# Patient Record
Sex: FEMALE | Race: WHITE | NOT HISPANIC OR LATINO | Employment: FULL TIME | URBAN - METROPOLITAN AREA
[De-identification: names, ages, dates, MRNs, and addresses within clinical notes are randomized per-mention and may not be internally consistent; named-entity substitution may affect disease eponyms.]

---

## 2017-03-06 ENCOUNTER — TRANSCRIBE ORDERS (OUTPATIENT)
Dept: ADMINISTRATIVE | Age: 22
End: 2017-03-06

## 2017-03-06 ENCOUNTER — APPOINTMENT (OUTPATIENT)
Dept: LAB | Age: 22
End: 2017-03-06
Attending: PREVENTIVE MEDICINE

## 2017-03-06 DIAGNOSIS — Z00.00 ROUTINE GENERAL MEDICAL EXAMINATION AT A HEALTH CARE FACILITY: ICD-10-CM

## 2017-03-06 DIAGNOSIS — Z00.00 ROUTINE GENERAL MEDICAL EXAMINATION AT A HEALTH CARE FACILITY: Primary | ICD-10-CM

## 2017-03-06 PROCEDURE — 36415 COLL VENOUS BLD VENIPUNCTURE: CPT

## 2017-03-07 ENCOUNTER — TRANSCRIBE ORDERS (OUTPATIENT)
Dept: ADMINISTRATIVE | Age: 22
End: 2017-03-07

## 2017-03-07 ENCOUNTER — APPOINTMENT (OUTPATIENT)
Dept: LAB | Age: 22
End: 2017-03-07
Attending: PREVENTIVE MEDICINE

## 2017-03-07 DIAGNOSIS — Z00.00 ROUTINE GENERAL MEDICAL EXAMINATION AT A HEALTH CARE FACILITY: Primary | ICD-10-CM

## 2017-03-07 DIAGNOSIS — Z00.00 ROUTINE GENERAL MEDICAL EXAMINATION AT A HEALTH CARE FACILITY: ICD-10-CM

## 2017-03-07 PROCEDURE — 86480 TB TEST CELL IMMUN MEASURE: CPT

## 2017-03-07 PROCEDURE — 36415 COLL VENOUS BLD VENIPUNCTURE: CPT

## 2017-03-09 LAB
ANNOTATION COMMENT IMP: NORMAL
GAMMA INTERFERON BACKGROUND BLD IA-ACNC: 0.04 IU/ML
M TB IFN-G BLD-IMP: NEGATIVE
M TB IFN-G CD4+ BCKGRND COR BLD-ACNC: <0.01 IU/ML
M TB IFN-G CD4+ T-CELLS BLD-ACNC: 0.03 IU/ML
MITOGEN IGNF BLD-ACNC: >10 IU/ML
QUANTIFERON-TB GOLD IN TUBE: NORMAL
SERVICE CMNT-IMP: NORMAL

## 2017-06-05 ENCOUNTER — ALLSCRIPTS OFFICE VISIT (OUTPATIENT)
Dept: OTHER | Facility: OTHER | Age: 22
End: 2017-06-05

## 2017-10-11 ENCOUNTER — OFFICE VISIT (OUTPATIENT)
Dept: URGENT CARE | Age: 22
End: 2017-10-11
Payer: COMMERCIAL

## 2017-10-11 PROCEDURE — 99283 EMERGENCY DEPT VISIT LOW MDM: CPT | Performed by: FAMILY MEDICINE

## 2017-10-11 PROCEDURE — G0382 LEV 3 HOSP TYPE B ED VISIT: HCPCS | Performed by: FAMILY MEDICINE
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
## 2017-12-26 ENCOUNTER — HOSPITAL ENCOUNTER (EMERGENCY)
Facility: HOSPITAL | Age: 22
Discharge: HOME | End: 2017-12-26
Attending: EMERGENCY MEDICINE

## 2017-12-26 VITALS
TEMPERATURE: 97.3 F | SYSTOLIC BLOOD PRESSURE: 150 MMHG | RESPIRATION RATE: 18 BRPM | DIASTOLIC BLOOD PRESSURE: 87 MMHG | WEIGHT: 165 LBS | OXYGEN SATURATION: 100 % | HEART RATE: 87 BPM

## 2017-12-26 DIAGNOSIS — R11.0 NAUSEA: ICD-10-CM

## 2017-12-26 DIAGNOSIS — R10.10 PAIN OF UPPER ABDOMEN: Primary | ICD-10-CM

## 2017-12-26 LAB
BACTERIA UR QL AUTO: ABNORMAL /HPF
BILIRUB UR QL STRIP: NEGATIVE
CLARITY UR: CLEAR
COLOR UR: YELLOW
COLOR, POC: NORMAL
EXT PREG TEST URINE: NEGATIVE
GLUCOSE UR STRIP-MCNC: NEGATIVE MG/DL
HGB UR QL STRIP.AUTO: ABNORMAL
HYALINE CASTS #/AREA URNS LPF: ABNORMAL /LPF
KETONES UR STRIP-MCNC: NEGATIVE MG/DL
LEUKOCYTE ESTERASE UR QL STRIP: ABNORMAL
NITRITE UR QL STRIP: NEGATIVE
NON-SQ EPI CELLS URNS QL MICRO: ABNORMAL /HPF
PH UR STRIP.AUTO: 7 [PH] (ref 4.5–8)
PROT UR STRIP-MCNC: NEGATIVE MG/DL
RBC #/AREA URNS AUTO: ABNORMAL /HPF
SP GR UR STRIP.AUTO: 1.02 (ref 1–1.03)
UROBILINOGEN UR QL STRIP.AUTO: 1 E.U./DL
WBC #/AREA URNS AUTO: ABNORMAL /HPF

## 2017-12-26 PROCEDURE — 99284 EMERGENCY DEPT VISIT MOD MDM: CPT

## 2017-12-26 PROCEDURE — 81001 URINALYSIS AUTO W/SCOPE: CPT

## 2017-12-26 PROCEDURE — 81025 URINE PREGNANCY TEST: CPT | Performed by: EMERGENCY MEDICINE

## 2017-12-26 PROCEDURE — 81002 URINALYSIS NONAUTO W/O SCOPE: CPT | Performed by: EMERGENCY MEDICINE

## 2017-12-26 RX ORDER — NORETHINDRONE ACETATE AND ETHINYL ESTRADIOL 1; .02 MG/1; MG/1
1 TABLET ORAL DAILY
COMMUNITY
Start: 2017-05-12 | End: 2021-02-24 | Stop reason: ALTCHOICE

## 2017-12-26 RX ORDER — ONDANSETRON 4 MG/1
4 TABLET, ORALLY DISINTEGRATING ORAL EVERY 6 HOURS PRN
Qty: 5 TABLET | Refills: 0 | Status: SHIPPED | OUTPATIENT
Start: 2017-12-26 | End: 2018-06-26 | Stop reason: ALTCHOICE

## 2017-12-26 RX ORDER — ONDANSETRON 4 MG/1
4 TABLET, ORALLY DISINTEGRATING ORAL ONCE
Status: COMPLETED | OUTPATIENT
Start: 2017-12-26 | End: 2017-12-26

## 2017-12-26 RX ADMIN — ONDANSETRON 4 MG: 4 TABLET, ORALLY DISINTEGRATING ORAL at 02:09

## 2017-12-26 NOTE — DISCHARGE INSTRUCTIONS
Abdominal Pain   WHAT YOU NEED TO KNOW:   Abdominal pain can be dull, achy, or sharp  You may have pain in one area of your abdomen, or in your entire abdomen  Your pain may be caused by a condition such as constipation, food sensitivity or poisoning, infection, or a blockage  Abdominal pain can also be from a hernia, appendicitis, or an ulcer  Liver, gallbladder, or kidney conditions can also cause abdominal pain  The cause of your abdominal pain may be unknown  DISCHARGE INSTRUCTIONS:   Return to the emergency department if:   · You have new chest pain or shortness of breath  · You have pulsing pain in your upper abdomen or lower back that suddenly becomes constant  · Your pain is in the right lower abdominal area and worsens with movement  · You have a fever over 100 4°F (38°C) or shaking chills  · You are vomiting and cannot keep food or liquids down  · Your pain does not improve or gets worse over the next 8 to 12 hours  · You see blood in your vomit or bowel movements, or they look black and tarry  · Your skin or the whites of your eyes turn yellow  · You are a woman and have a large amount of vaginal bleeding that is not your monthly period  Contact your healthcare provider if:   · You have pain in your lower back  · You are a man and have pain in your testicles  · You have pain when you urinate  · You have questions or concerns about your condition or care  Follow up with your healthcare provider within 24 hours or as directed:  Write down your questions so you remember to ask them during your visits  Medicines:   · Medicines  may be given to calm your stomach and prevent vomiting or to decrease pain  Ask how to take pain medicine safely  · Take your medicine as directed  Contact your healthcare provider if you think your medicine is not helping or if you have side effects  Tell him of her if you are allergic to any medicine   Keep a list of the medicines, vitamins, and herbs you take  Include the amounts, and when and why you take them  Bring the list or the pill bottles to follow-up visits  Carry your medicine list with you in case of an emergency  © 2017 [facility] Information is for End User's use only and may not be sold, redistributed or otherwise used for commercial purposes  All illustrations and images included in CareNotes® are the copyrighted property of A D A M , Inc  or Kris Dey  The above information is an  only  It is not intended as medical advice for individual conditions or treatments  Talk to your doctor, nurse or pharmacist before following any medical regimen to see if it is safe and effective for you

## 2017-12-26 NOTE — ED ATTENDING ATTESTATION
Dwight Calabrese MD, saw and evaluated the patient  I have discussed the patient with the resident/non-physician practitioner and agree with the resident's/non-physician practitioner's findings, Plan of Care, and MDM as documented in the resident's/non-physician practitioner's note, except where noted  All available labs and Radiology studies were reviewed  At this point I agree with the current assessment done in the Emergency Department  I have conducted an independent evaluation of this patient including a focused history of:    Emergency Department Note- Paula Jolly 25 y o  female MRN: [id number]    Unit/Bed#: CRB Encounter: [id number]    Paula Jolly is a 25 y o  female who presents with   Chief Complaint   Patient presents with    Abdominal Pain     pt c/o upper abdominal pain since saturday  notes associated nausea  History of Present Illness   HPI:  Paula Jolly is a 25 y o  female who presents for evaluation of:   abdominal discomfort  The patient has had intermittent abdominal discomfort since Saturday  She had another exacerbation of her abdominal discomfort this evening  The patient had some associated nausea but has not vomited  She denies any associated diarrhea or constipation  The patient denies contact with patients with similar symptoms  Patient denies any associated dysuria and hematuria  No LMP recorded  Review of Systems   Constitutional: Positive for fatigue  Negative for fever  HENT: Negative for congestion and sore throat  Respiratory: Negative for cough and chest tightness  All other systems reviewed and are negative  Historical Information   Past Medical History:   Diagnosis Date    Asthma      History reviewed  No pertinent surgical history    Social History   History   Alcohol Use    Yes     Comment: occasional     History   Drug Use No     History   Smoking Status    Never Smoker   Smokeless Tobacco    Never Used     Family History: non-contributory    Meds/Allergies   all medications and allergies reviewed  No Known Allergies    Objective   First Vitals:   Blood Pressure: 150/87 (17 0121)  Pulse: 87 (17 012)  Temperature: (!) 97 3 °F (36 3 °C) (17 012)  Temp Source: Tympanic (17)  Respirations: 18 (17)  Weight - Scale: 74 8 kg (165 lb) (17)  SpO2: 100 % (17 012)    Current Vitals:   Blood Pressure: 150/87 (17 012)  Pulse: 87 (17 012)  Temperature: (!) 97 3 °F (36 3 °C) (17)  Temp Source: Tympanic (17)  Respirations: 18 (17)  Weight - Scale: 74 8 kg (165 lb) (17)  SpO2: 100 % (17)    No intake or output data in the 24 hours ending 17 0208    Invasive Devices          No matching active lines, drains, or airways          Physical Exam   Constitutional: She is oriented to person, place, and time  She appears well-developed and well-nourished  HENT:   Head: Normocephalic and atraumatic  Eyes: Conjunctivae are normal  Pupils are equal, round, and reactive to light  Pulmonary/Chest: Effort normal and breath sounds normal    Abdominal: Soft  Bowel sounds are normal    Musculoskeletal: Normal range of motion  She exhibits no deformity  Neurological: She is alert and oriented to person, place, and time  Skin: Skin is warm and dry  Psychiatric: She has a normal mood and affect  Her behavior is normal  Judgment and thought content normal    Nursing note and vitals reviewed  Medical Decision Makin  Acute nausea associated with intermittent abdominal pain:  Likely  Viral gastritis; plan to treat with antiemetics      Recent Results (from the past 36 hour(s))   POCT urinalysis dipstick    Collection Time: 17  1:41 AM   Result Value Ref Range    Color, UA see results    POCT pregnancy, urine    Collection Time: 17  1:41 AM   Result Value Ref Range    EXT PREG TEST UR (Ref: Negative) negative    ED Urine Macroscopic    Collection Time: 12/26/17  1:41 AM   Result Value Ref Range    Color, UA Yellow     Clarity, UA Clear     pH, UA 7 0 4 5 - 8 0    Leukocytes, UA Trace (A) Negative    Nitrite, UA Negative Negative    Protein, UA Negative Negative mg/dl    Glucose, UA Negative Negative mg/dl    Ketones, UA Negative Negative mg/dl    Urobilinogen, UA 1 0 0 2, 1 0 E U /dl E U /dl    Bilirubin, UA Negative Negative    Blood, UA Trace (A) Negative    Specific Gravity, UA 1 025 1 003 - 1 030   Urine Microscopic    Collection Time: 12/26/17  1:41 AM   Result Value Ref Range    RBC, UA 4-10 (A) None Seen, 0-5 /hpf    WBC, UA 4-10 (A) None Seen, 0-5, 5-55, 5-65 /hpf    Epithelial Cells Occasional None Seen, Occasional /hpf    Bacteria, UA Occasional None Seen, Occasional /hpf    Hyaline Casts, UA 5-10 (A) None Seen /lpf     No orders to display         Portions of the record may have been created with voice recognition software  Occasional wrong word or "sound a like" substitutions may have occurred due to the inherent limitations of voice recognition software  Read the chart carefully and recognize, using context, where substitutions have occurred

## 2017-12-26 NOTE — ED PROVIDER NOTES
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 Taking?   norethindrone-ethinyl estradiol (JUNEL 1/20) 1-20 MG-MCG per tablet   Yes Yes   Sig: Take 1 tablet by mouth daily      Facility-Administered Medications: None     Past Medical History:   Diagnosis Date    Asthma      History reviewed  No pertinent surgical history  History reviewed  No pertinent family history  I have reviewed and agree with the history as documented  Social History   Substance Use Topics    Smoking status: Never Smoker    Smokeless tobacco: Never Used    Alcohol use Yes      Comment: occasional        Review of Systems   Constitutional: Negative for chills, fatigue, fever and unexpected weight change  HENT: Negative for congestion, rhinorrhea and sore throat  Eyes: Negative for redness and visual disturbance  Respiratory: Negative for cough and shortness of breath  Cardiovascular: Negative for chest pain and leg swelling  Gastrointestinal: Positive for nausea  Negative for abdominal pain, constipation, diarrhea and vomiting  Endocrine: Negative for cold intolerance and heat intolerance  Genitourinary: Negative for dysuria, frequency and urgency     Musculoskeletal: Negative for back pain    Skin: Negative for rash  Neurological: Negative for dizziness, syncope and numbness  All other systems reviewed and are negative  Physical Exam  ED Triage Vitals [12/26/17 0121]   Temperature Pulse Respirations Blood Pressure SpO2   (!) 97 3 °F (36 3 °C) 87 18 150/87 100 %      Temp Source Heart Rate Source Patient Position - Orthostatic VS BP Location FiO2 (%)   Tympanic Monitor Sitting Left arm --      Pain Score       5           Orthostatic Vital Signs  Vitals:    12/26/17 0121   BP: 150/87   Pulse: 87   Patient Position - Orthostatic VS: Sitting       Physical Exam   Constitutional: She is oriented to person, place, and time  She appears well-developed and well-nourished  No distress  HENT:   Head: Normocephalic and atraumatic  Nose: Nose normal    Mouth/Throat: No oropharyngeal exudate  Eyes: Conjunctivae and EOM are normal  Pupils are equal, round, and reactive to light  Neck: Normal range of motion  Neck supple  Cardiovascular: Normal rate, regular rhythm and normal heart sounds  Exam reveals no gallop  No murmur heard  Pulmonary/Chest: Effort normal and breath sounds normal  She has no wheezes  She exhibits no tenderness  Abdominal: Soft  Bowel sounds are normal  She exhibits no distension  There is no tenderness  There is no rebound, no guarding and no CVA tenderness  Musculoskeletal: Normal range of motion  She exhibits no tenderness or deformity  Lymphadenopathy:     She has no cervical adenopathy  Neurological: She is alert and oriented to person, place, and time  No cranial nerve deficit  Skin: Skin is warm and dry  No rash noted  She is not diaphoretic  No erythema  Psychiatric: She has a normal mood and affect  Nursing note and vitals reviewed      ED Medications  Medications   ondansetron (ZOFRAN-ODT) dispersible tablet 4 mg (4 mg Oral Given 12/26/17 0209)       Diagnostic Studies  Results Reviewed     Procedure Component Value Units Date/Time    Urine Microscopic [[id number]]  (Abnormal) Collected:  12/26/17 0141    Lab Status:  Final result Specimen:  Urine from Urine, Other Updated:  12/26/17 0152     RBC, UA 4-10 (A) /hpf      WBC, UA 4-10 (A) /hpf      Epithelial Cells Occasional /hpf      Bacteria, UA Occasional /hpf      Hyaline Casts, UA 5-10 (A) /lpf     POCT pregnancy, urine [[id number]]  (Normal) Resulted:  12/26/17 0141    Lab Status:  Final result Updated:  12/26/17 0141     EXT PREG TEST UR (Ref: Negative) negative    POCT urinalysis dipstick [[id number]]  (Normal) Resulted:  12/26/17 0141    Lab Status:  Final result Updated:  12/26/17 0141     Color, UA see results    ED Urine Macroscopic [[id number]]  (Abnormal) Collected:  12/26/17 0141    Lab Status:  Final result Specimen:  Urine Updated:  12/26/17 0140     Color, UA Yellow     Clarity, UA Clear     pH, UA 7 0     Leukocytes, UA Trace (A)     Nitrite, UA Negative     Protein, UA Negative mg/dl      Glucose, UA Negative mg/dl      Ketones, UA Negative mg/dl      Urobilinogen, UA 1 0 E U /dl      Bilirubin, UA Negative     Blood, UA Trace (A)     Specific [place], UA 1 025    Narrative:       CLINITEK RESULT        No orders to display     Procedures  Procedures    Phone Consults  ED Phone Contact    ED Course    A/P: This is a 25 y o  female who presents to the ED for evaluation of   Abdominal pain  Patient is well-appearing with normal vital signs and they absolutely normal exam   I cannot elicit any tenderness of the flank or abdomen  We will check UA to rule out pregnancy, urinary tract infection, pyelonephritis  If this is normal, we will treat symptomatically for nausea and discharged home with expectant management  0245   Urinalysis reviewed  Urine microscopic shows squamous epithelial cells suspicious for contaminated sample  In absence of overwhelming signs of urinary tract infection, we will not treat at this time  Suspect viral illness    She feels better after having a dose of sublingual Zofran  Suspect nonspecific abdominal pain  Return precautions discussed  Patient and family acknowledge receipt of same  We will discharge this patient home with primary care follow-up  Patient is in agreement with this plan as outlined above  MDM  CritCare Time    Disposition  Final diagnoses:   Pain of upper abdomen   Nausea     Time reflects when diagnosis was documented in both MDM as applicable and the Disposition within this note     Time User Action Codes Description Comment    12/26/2017  2:09 AM Alberta Sincere Add [R10 10] Pain of upper abdomen     12/26/2017  2:10 AM Promise Sanchezdy Add [R11 0] Nausea       ED Disposition     ED Disposition Condition Comment    Discharge  Thanh Lavinia discharge to home/self care  Condition at discharge: Stable        Follow-up Information     Follow up With Specialties Details Why Contact Info Additional [address] [facility] Nikki Jaffe MD Family Medicine Schedule an appointment as soon as possible for a visit in 2 days For reevaluation 91 [facility] Briana GAO [place] [address]       [address] Emergency Department Emergency Medicine Go to If symptoms worsen [address] [facility] 809 [facility] ED, [address], [facility]        Discharge Medication List as of 12/26/2017  2:10 AM      START taking these medications    Details   ondansetron (ZOFRAN-ODT) 4 mg disintegrating tablet Take 1 tablet by mouth every 6 (six) hours as needed for nausea or vomiting, Starting Tue 12/26/2017, Print         CONTINUE these medications which have NOT CHANGED    Details   norethindrone-ethinyl estradiol (JUNEL 1/20) 1-20 MG-MCG per tablet Take 1 tablet by mouth daily, Starting Fri 5/12/2017, Historical Med           No discharge procedures on file  ED Provider  Attending physically available and evaluated Thanh Lavinia VELIZ managed the patient along with the ED Attending      Electronically Signed by         Zoltan Farfan MD  Resident  12/26/17 9481

## 2018-01-13 VITALS
HEART RATE: 76 BPM | BODY MASS INDEX: 27.45 KG/M2 | RESPIRATION RATE: 16 BRPM | DIASTOLIC BLOOD PRESSURE: 62 MMHG | HEIGHT: 64 IN | WEIGHT: 160.8 LBS | SYSTOLIC BLOOD PRESSURE: 124 MMHG | TEMPERATURE: 98.5 F

## 2018-06-26 ENCOUNTER — OFFICE VISIT (OUTPATIENT)
Dept: FAMILY MEDICINE CLINIC | Facility: CLINIC | Age: 23
End: 2018-06-26

## 2018-06-26 VITALS
WEIGHT: 162.4 LBS | SYSTOLIC BLOOD PRESSURE: 108 MMHG | HEART RATE: 76 BPM | RESPIRATION RATE: 16 BRPM | TEMPERATURE: 99.6 F | DIASTOLIC BLOOD PRESSURE: 66 MMHG | BODY MASS INDEX: 27.88 KG/M2

## 2018-06-26 DIAGNOSIS — R39.9 SYMPTOMS OF URINARY TRACT INFECTION: Primary | ICD-10-CM

## 2018-06-26 DIAGNOSIS — N30.01 ACUTE CYSTITIS WITH HEMATURIA: ICD-10-CM

## 2018-06-26 LAB
SL AMB  POCT GLUCOSE, UA: NORMAL
SL AMB LEUKOCYTE ESTERASE,UA: NORMAL
SL AMB POCT BILIRUBIN,UA: NORMAL
SL AMB POCT BLOOD,UA: NORMAL
SL AMB POCT CLARITY,UA: NORMAL
SL AMB POCT COLOR,UA: NORMAL
SL AMB POCT KETONES,UA: NORMAL
SL AMB POCT NITRITE,UA: NORMAL
SL AMB POCT PH,UA: 6.5
SL AMB POCT SPECIFIC GRAVITY,UA: 1.01
SL AMB POCT URINE PROTEIN: NORMAL
SL AMB POCT UROBILINOGEN: 0.2

## 2018-06-26 PROCEDURE — 99213 OFFICE O/P EST LOW 20 MIN: CPT | Performed by: INTERNAL MEDICINE

## 2018-06-26 PROCEDURE — 81002 URINALYSIS NONAUTO W/O SCOPE: CPT | Performed by: INTERNAL MEDICINE

## 2018-06-26 RX ORDER — ALBUTEROL SULFATE 90 UG/1
2 AEROSOL, METERED RESPIRATORY (INHALATION) EVERY 6 HOURS
COMMUNITY
Start: 2017-10-16 | End: 2020-02-21 | Stop reason: SDUPTHER

## 2018-06-26 RX ORDER — CIPROFLOXACIN 500 MG/1
500 TABLET, FILM COATED ORAL EVERY 12 HOURS SCHEDULED
Qty: 10 TABLET | Refills: 0 | Status: SHIPPED | OUTPATIENT
Start: 2018-06-26 | End: 2018-07-01

## 2018-06-26 NOTE — PATIENT INSTRUCTIONS

## 2018-06-26 NOTE — PROGRESS NOTES
ASSESSMENT and PLAN:  Nuzhat Manning is a 26 y o  female with:   Problem List Items Addressed This Visit     Acute cystitis with hematuria     Add cipro  Call if no better  Hydrate           Relevant Medications    ciprofloxacin (CIPRO) 500 mg tablet      Other Visit Diagnoses     Symptoms of urinary tract infection    -  Primary    Relevant Medications    ciprofloxacin (CIPRO) 500 mg tablet    Other Relevant Orders    POCT urine dip (Completed)          SUBJECTIVE:  Nuzhat Manning is a 26 y o  female who presents today with a chief complaint of Painful Urination    Dysuria for 2 days; Pain in lower abdomen; her urine shows leuk and blood; she did have hematuria; Review of Systems   Constitutional: Negative  HENT: Negative  Eyes: Negative  Respiratory: Negative  Cardiovascular: Negative  Gastrointestinal: Negative  Genitourinary: Positive for dysuria, frequency, pelvic pain and urgency  Negative for difficulty urinating, dyspareunia, enuresis and flank pain  Allergic/Immunologic: Negative  Neurological: Negative  Hematological: Negative  Psychiatric/Behavioral: Negative  I have reviewed the patient's PMH, Social History, Medication List and Allergies  OBJECTIVE:  /66 (BP Location: Left arm, Patient Position: Sitting, Cuff Size: Adult)   Pulse 76   Temp 99 6 °F (37 6 °C) (Tympanic)   Resp 16   Wt 73 7 kg (162 lb 6 4 oz)   LMP 06/18/2018   BMI 27 88 kg/m²   Physical Exam   Constitutional: She is oriented to person, place, and time  She appears well-developed and well-nourished  HENT:   Head: Normocephalic and atraumatic  Right Ear: External ear normal    Left Ear: External ear normal    Eyes: Pupils are equal, round, and reactive to light  Neck: Normal range of motion  Neck supple  Cardiovascular: Normal rate, regular rhythm and normal heart sounds  Pulmonary/Chest: Effort normal and breath sounds normal  No respiratory distress   She has no wheezes  Abdominal: Soft  She exhibits no mass  There is tenderness (suprapubic tenderness ; left upper quad tenderness; normal bs;  no cva tenderness)  There is no guarding  Musculoskeletal: Normal range of motion  Neurological: She is oriented to person, place, and time  Skin: Skin is warm and dry  Psychiatric: She has a normal mood and affect  Her behavior is normal    Nursing note and vitals reviewed

## 2018-08-13 ENCOUNTER — OFFICE VISIT (OUTPATIENT)
Dept: FAMILY MEDICINE CLINIC | Facility: CLINIC | Age: 23
End: 2018-08-13
Payer: COMMERCIAL

## 2018-08-13 VITALS
SYSTOLIC BLOOD PRESSURE: 98 MMHG | HEIGHT: 64 IN | WEIGHT: 155.4 LBS | DIASTOLIC BLOOD PRESSURE: 58 MMHG | TEMPERATURE: 99.6 F | HEART RATE: 88 BPM | BODY MASS INDEX: 26.53 KG/M2

## 2018-08-13 DIAGNOSIS — J01.00 ACUTE NON-RECURRENT MAXILLARY SINUSITIS: Primary | ICD-10-CM

## 2018-08-13 PROCEDURE — 3008F BODY MASS INDEX DOCD: CPT | Performed by: INTERNAL MEDICINE

## 2018-08-13 PROCEDURE — 99213 OFFICE O/P EST LOW 20 MIN: CPT | Performed by: INTERNAL MEDICINE

## 2018-08-13 RX ORDER — PREDNISONE 10 MG/1
TABLET ORAL
Qty: 15 TABLET | Refills: 0 | Status: SHIPPED | OUTPATIENT
Start: 2018-08-13 | End: 2018-12-04 | Stop reason: ALTCHOICE

## 2018-08-13 RX ORDER — AZITHROMYCIN 250 MG/1
TABLET, FILM COATED ORAL
Qty: 6 TABLET | Refills: 0 | Status: SHIPPED | OUTPATIENT
Start: 2018-08-13 | End: 2018-08-17

## 2018-08-13 RX ORDER — OFLOXACIN 3 MG/ML
2 SOLUTION/ DROPS OPHTHALMIC 4 TIMES DAILY
Refills: 1 | COMMUNITY
Start: 2018-08-08 | End: 2018-12-04 | Stop reason: ALTCHOICE

## 2018-08-13 NOTE — ASSESSMENT & PLAN NOTE
Due to worsening of sx and wheezing/sinus pressure treat with abx and pred  Update us in 24 hours  Rest  Hydrate  Continue with otc meds

## 2018-08-13 NOTE — PROGRESS NOTES
ASSESSMENT and PLAN:  Thanh Kate is a 26 y o  female with:   Problem List Items Addressed This Visit     Acute non-recurrent maxillary sinusitis - Primary     Due to worsening of sx and wheezing/sinus pressure treat with abx and pred  Update us in 24 hours  Rest  Hydrate  Continue with otc meds         Relevant Medications    azithromycin (ZITHROMAX) 250 mg tablet    predniSONE 10 mg tablet          SUBJECTIVE:  Thanh Kate is a 26 y o  female who presents today with a chief complaint of Nasal Congestion; Cough; and Conjunctivitis    patietn started with pink eye on left- was put on drops- got worse; was referred to ophtho- dr Johnson Challenger- had metal shard in left eye-  Also put on eye drops-  Got better on left side- now with right eye redness; On thrus and Friday she had sinus pressure, post nasal drip, cough, congestion and feeling poorly with low grade temp she is on steroid eye drops and abx eye drops       Review of Systems   Constitutional: Positive for fatigue and fever  HENT: Positive for congestion, postnasal drip, rhinorrhea, sinus pain, sinus pressure and sore throat  Eyes: Positive for redness  Respiratory: Positive for cough  Cardiovascular: Negative  Gastrointestinal: Negative  Endocrine: Negative  Genitourinary: Negative  Musculoskeletal: Negative  Skin: Negative  Allergic/Immunologic: Negative  Neurological: Negative  Hematological: Negative  Psychiatric/Behavioral: Negative  I have reviewed the patient's PMH, Social History, Medication List and Allergies  OBJECTIVE:  BP 98/58 (BP Location: Left arm, Patient Position: Sitting, Cuff Size: Large)   Pulse 88   Temp 99 6 °F (37 6 °C)   Ht 5' 4" (1 626 m)   Wt 70 5 kg (155 lb 6 4 oz)   LMP 07/13/2018 (Exact Date)   Breastfeeding? No   BMI 26 67 kg/m²   Physical Exam   Constitutional: She is oriented to person, place, and time  She appears well-developed and well-nourished     HENT:   Head: Normocephalic and atraumatic  Right Ear: External ear normal    Left Ear: External ear normal    Nose: Nose normal    Mouth/Throat: Oropharynx is clear and moist    Left tm erythmeatousPressure b/l max sinus   Eyes: EOM are normal  Pupils are equal, round, and reactive to light  Right conjunctiva is injected  Left conjunctiva is injected  Neck: Normal range of motion  No JVD present  No tracheal deviation present  No thyromegaly present  Cardiovascular: Normal rate, regular rhythm and normal heart sounds  Pulmonary/Chest: Effort normal  She has wheezes (exp wheeze on left)  Lymphadenopathy:     She has no cervical adenopathy  Neurological: She is alert and oriented to person, place, and time  She has normal reflexes  Skin: Skin is warm and dry  Nursing note and vitals reviewed

## 2018-09-20 ENCOUNTER — TELEPHONE (OUTPATIENT)
Dept: FAMILY MEDICINE CLINIC | Facility: CLINIC | Age: 23
End: 2018-09-20

## 2018-09-20 DIAGNOSIS — R39.15 URINARY URGENCY: Primary | ICD-10-CM

## 2018-09-20 RX ORDER — NITROFURANTOIN 25; 75 MG/1; MG/1
100 CAPSULE ORAL 2 TIMES DAILY
Qty: 10 CAPSULE | Refills: 0 | Status: SHIPPED | OUTPATIENT
Start: 2018-09-20 | End: 2018-09-21 | Stop reason: SDUPTHER

## 2018-09-20 NOTE — TELEPHONE ENCOUNTER
Pt calling with sx of abd pain, frequency, burning x 3days  She does not have time to come in for an appt today because she will be in a meeting at work  She is asking if jadyn can send something in to [facility] on [facility]  Pt was seen on 06/26/18 for similar sx

## 2018-09-21 DIAGNOSIS — R39.15 URINARY URGENCY: ICD-10-CM

## 2018-09-21 RX ORDER — NITROFURANTOIN 25; 75 MG/1; MG/1
100 CAPSULE ORAL 2 TIMES DAILY
Qty: 10 CAPSULE | Refills: 0 | Status: SHIPPED | OUTPATIENT
Start: 2018-09-21 | End: 2018-12-04 | Stop reason: ALTCHOICE

## 2018-11-07 ENCOUNTER — IMMUNIZATION (OUTPATIENT)
Dept: FAMILY MEDICINE CLINIC | Facility: CLINIC | Age: 23
End: 2018-11-07
Payer: COMMERCIAL

## 2018-11-07 DIAGNOSIS — Z23 NEED FOR IMMUNIZATION AGAINST INFLUENZA: Primary | ICD-10-CM

## 2018-11-07 PROCEDURE — 90686 IIV4 VACC NO PRSV 0.5 ML IM: CPT

## 2018-11-07 PROCEDURE — 90471 IMMUNIZATION ADMIN: CPT

## 2018-12-04 ENCOUNTER — OFFICE VISIT (OUTPATIENT)
Dept: FAMILY MEDICINE CLINIC | Facility: CLINIC | Age: 23
End: 2018-12-04
Payer: COMMERCIAL

## 2018-12-04 VITALS
DIASTOLIC BLOOD PRESSURE: 60 MMHG | SYSTOLIC BLOOD PRESSURE: 100 MMHG | WEIGHT: 163.2 LBS | BODY MASS INDEX: 27.19 KG/M2 | TEMPERATURE: 97.4 F | HEIGHT: 65 IN | HEART RATE: 60 BPM | RESPIRATION RATE: 16 BRPM

## 2018-12-04 DIAGNOSIS — F41.9 ANXIETY: ICD-10-CM

## 2018-12-04 DIAGNOSIS — D50.8 OTHER IRON DEFICIENCY ANEMIA: ICD-10-CM

## 2018-12-04 DIAGNOSIS — R53.82 CHRONIC FATIGUE: ICD-10-CM

## 2018-12-04 DIAGNOSIS — Z13.220 SCREENING FOR HYPERLIPIDEMIA: ICD-10-CM

## 2018-12-04 DIAGNOSIS — Z00.00 ROUTINE ADULT HEALTH MAINTENANCE: Primary | ICD-10-CM

## 2018-12-04 PROBLEM — D64.9 ABSOLUTE ANEMIA: Status: ACTIVE | Noted: 2018-12-04

## 2018-12-04 PROBLEM — J01.00 ACUTE NON-RECURRENT MAXILLARY SINUSITIS: Status: RESOLVED | Noted: 2018-08-13 | Resolved: 2018-12-04

## 2018-12-04 PROBLEM — N30.01 ACUTE CYSTITIS WITH HEMATURIA: Status: RESOLVED | Noted: 2018-06-26 | Resolved: 2018-12-04

## 2018-12-04 PROCEDURE — 99395 PREV VISIT EST AGE 18-39: CPT | Performed by: FAMILY MEDICINE

## 2018-12-04 NOTE — PROGRESS NOTES
Nolan [age] y o  female   DATE: December 4, 2018     Assessment and Plan:  26 y o  female exam      1  Health Maintenance  - Pap? F/w Gyn, UTD  - Labs: FLP, CMP, CBC, TSH  - Immunizations: Reviewed  Recommend yearly flu vaccine  Health Maintenance   Topic Date Due    Depression Screening PHQ  1996    PAP SMEAR  02/19/2017    DTaP,Tdap,and Td Vaccines (8 - Td) 07/12/2026    INFLUENZA VACCINE  Completed       2  Discussed the patient's BMI with her, (Body mass index is 27 16 kg/m²  )  Advised a healthy, balanced diet and regular exercise for 30 minutes 4-5 times a week  3  Patient Counseling: Patient given handout  --Nutrition: Stressed importance of moderation in sodium/caffeine intake, saturated fat and cholesterol, caloric balance, sufficient intake of fresh fruits, vegetables, fiber, calcium, iron, and 1 mg of folate supplement per day (for females capable of pregnancy)  --Exercise: Stressed the importance of regular exercise  --Substance Abuse: Discussed cessation/primary prevention of tobacco, alcohol, or other drug use; driving or other dangerous activities under the influence; availability of treatment for abuse  --Dental health: Discussed importance of regular tooth brushing, flossing, and dental visits  4  Other diagnoses addressed today:   Absolute anemia  H/o MARILEE with her pregnancy 2 years ago, no recheck since delivery and she is experiencing fatigue  Also check CMP and TSH    Anxiety  Pt describes sx consistent with EDER and may have some anxiety manifestations with palpitations, but normal exam  Check baseline labs  Pt would like to avoid medications, reviewed relaxation techniques and pt will consider CBT    Follow up next physical in 1 year  Subjective: Pa Goodwin is a 26 y o  female and is here for a comprehensive physical exam    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  Acute Complaints:   1   Random dizzy spells and chest pains and thinks that she has anxiety  She has a 3year old that makes her anxious  No past history of hospitalizations, or history of medications  2  Asthma- uses Albuterol PRN  3  On OCPs without any issues  Had a Pap a few years ago  4   Had a fall and twisted her right ankle a few weeks ago and had some right shin numbness that has been resolving but still has a small area where she feels numb  Histories Updated and Reviewed 12/4/2018:  Patient's Medications   New Prescriptions    No medications on file   Previous Medications    ALBUTEROL (PROVENTIL HFA,VENTOLIN HFA) 90 MCG/ACT INHALER    Inhale 2 puffs every 6 (six) hours    NORETHINDRONE-ETHINYL ESTRADIOL (JUNEL 1/20) 1-20 MG-MCG PER TABLET    Take 1 tablet by mouth daily   Modified Medications    No medications on file   Discontinued Medications    NITROFURANTOIN (MACROBID) 100 MG CAPSULE    Take 1 capsule (100 mg total) by mouth 2 (two) times a day    OFLOXACIN (OCUFLOX) 0 3 % OPHTHALMIC SOLUTION    Administer 2 drops to both eyes 4 (four) times a day    PREDNISONE 10 MG TABLET    1 5 TABS PO BID FOR THREE DAYS THEN 1 PO BID FOR 3 DAYS AND OFF- TAKE WITH FOOD     No Known Allergies  Past Medical History:   Diagnosis Date    Asthma      Social History     Social History    Marital status: Single     Spouse name: N/A    Number of children: N/A    Years of education: N/A     Occupational History   [employer]     Social History Main Topics    Smoking status: Never Smoker    Smokeless tobacco: Never Used    Alcohol use Yes      Comment: occasional    Drug use: No    Sexual activity: Not on file     Other Topics Concern    Not on file     Social History Narrative    No narrative on file     Immunization History   Administered Date(s) Administered    DTaP 5 1996, 1996, 1996, 10/28/1997, 08/22/2001    Hep B, adult 1996, 1996, 1996    Hib (PRP-OMP) 1996, 1996, 1996, 06/06/1997    IPV 1996, 1996, 10/28/1997, 08/22/2001    Influenza 01/01/2016, 01/01/2016, 10/06/2016, 10/06/2016, 11/07/2018    Influenza, injectable, quadrivalent, preservative free 0 5 mL 11/07/2018    MMR 06/06/1997, 08/22/2001    Meningococcal, Unknown Serogroups 02/22/2012    Tdap 10/29/2007, 07/12/2016    Varicella 08/22/2001, 10/29/2007       Review of Systems:  Review of Systems   Constitutional: Negative for chills and fever  HENT: Negative for ear pain  Eyes: Negative for visual disturbance  Respiratory: Negative for cough and shortness of breath  Cardiovascular: Negative for chest pain and palpitations  Gastrointestinal: Negative for abdominal pain, diarrhea, nausea and vomiting  Musculoskeletal: Positive for arthralgias  Skin: Negative for rash  Neurological: Positive for numbness  Negative for headaches  Hematological: Does not bruise/bleed easily  Psychiatric/Behavioral: The patient is nervous/anxious  PHQ-9 Depression Screening    PHQ-9:    Frequency of the following problems over the past two weeks:       Little interest or pleasure in doing things:  0 - not at all  Feeling down, depressed, or hopeless:  0 - not at all  PHQ-2 Score:  0       Objective:  /60 (BP Location: Left arm, Patient Position: Sitting, Cuff Size: Standard)   Pulse 60   Temp (!) 97 4 °F (36 3 °C)   Resp 16   Ht 5' 5" (1 651 m)   Wt 74 kg (163 lb 3 2 oz)   LMP 12/04/2018 (Exact Date)   Breastfeeding? No   BMI 27 16 kg/m²   Physical Exam   Constitutional: She is oriented to person, place, and time  She appears well-developed and well-nourished  No distress  HENT:   Head: Normocephalic and atraumatic  Mouth/Throat: Oropharynx is clear and moist  No oropharyngeal exudate  Eyes: Pupils are equal, round, and reactive to light  EOM are normal  Right eye exhibits no discharge  Left eye exhibits no discharge  Neck: Normal range of motion  Neck supple  No JVD present     Cardiovascular: Normal rate, regular rhythm and normal heart sounds  No murmur heard  Pulmonary/Chest: Effort normal and breath sounds normal  No stridor  No respiratory distress  She has no wheezes  Abdominal: Soft  Bowel sounds are normal  There is no tenderness  There is no rebound and no guarding  Musculoskeletal: Normal range of motion  She exhibits no edema or tenderness  Neurological: She is alert and oriented to person, place, and time  Skin: Skin is warm and dry  She is not diaphoretic  No erythema  Psychiatric: She has a normal mood and affect  Her behavior is normal    Vitals reviewed  Patient Care Team:  Sterling Gillespie MD as PCP - General (Family Medicine)    Sterling Gillespie MD    Note: Portions of the record may have been created with voice recognition software  Occasional wrong word or "sound a like" substitutions may have occurred due to the inherent limitations of voice recognition software  Read the chart carefully and recognize, using context, where substitutions have occurred

## 2018-12-04 NOTE — ASSESSMENT & PLAN NOTE
H/o MARILEE with her pregnancy 2 years ago, no recheck since delivery and she is experiencing fatigue  Also check CMP and TSH

## 2018-12-04 NOTE — ASSESSMENT & PLAN NOTE
Pt describes sx consistent with EDER and may have some anxiety manifestations with palpitations, but normal exam  Check baseline labs   Pt would like to avoid medications, reviewed relaxation techniques and pt will consider CBT

## 2018-12-27 ENCOUNTER — OFFICE VISIT (OUTPATIENT)
Dept: FAMILY MEDICINE CLINIC | Facility: CLINIC | Age: 23
End: 2018-12-27
Payer: COMMERCIAL

## 2018-12-27 ENCOUNTER — APPOINTMENT (OUTPATIENT)
Dept: LAB | Facility: CLINIC | Age: 23
End: 2018-12-27
Payer: COMMERCIAL

## 2018-12-27 ENCOUNTER — HOSPITAL ENCOUNTER (EMERGENCY)
Facility: HOSPITAL | Age: 23
Discharge: HOME | End: 2018-12-27
Attending: EMERGENCY MEDICINE | Admitting: EMERGENCY MEDICINE
Payer: COMMERCIAL

## 2018-12-27 ENCOUNTER — APPOINTMENT (EMERGENCY)
Dept: RADIOLOGY | Facility: HOSPITAL | Age: 23
End: 2018-12-27
Payer: COMMERCIAL

## 2018-12-27 VITALS
RESPIRATION RATE: 16 BRPM | HEART RATE: 88 BPM | WEIGHT: 164.8 LBS | TEMPERATURE: 97.7 F | BODY MASS INDEX: 28.13 KG/M2 | HEIGHT: 64 IN | DIASTOLIC BLOOD PRESSURE: 64 MMHG | SYSTOLIC BLOOD PRESSURE: 100 MMHG

## 2018-12-27 VITALS
BODY MASS INDEX: 28.15 KG/M2 | OXYGEN SATURATION: 97 % | RESPIRATION RATE: 20 BRPM | TEMPERATURE: 98.4 F | WEIGHT: 164 LBS | DIASTOLIC BLOOD PRESSURE: 70 MMHG | HEART RATE: 112 BPM | SYSTOLIC BLOOD PRESSURE: 142 MMHG

## 2018-12-27 DIAGNOSIS — D50.8 OTHER IRON DEFICIENCY ANEMIA: ICD-10-CM

## 2018-12-27 DIAGNOSIS — I49.3 PVC (PREMATURE VENTRICULAR CONTRACTION): ICD-10-CM

## 2018-12-27 DIAGNOSIS — Z13.220 SCREENING FOR HYPERLIPIDEMIA: ICD-10-CM

## 2018-12-27 DIAGNOSIS — R00.2 HEART PALPITATIONS: ICD-10-CM

## 2018-12-27 DIAGNOSIS — R00.0 SINUS TACHYCARDIA BY ELECTROCARDIOGRAM: Primary | ICD-10-CM

## 2018-12-27 DIAGNOSIS — F41.9 ANXIETY: Primary | ICD-10-CM

## 2018-12-27 LAB
ALBUMIN SERPL BCP-MCNC: 4 G/DL (ref 3.5–5)
ALBUMIN SERPL BCP-MCNC: 4.1 G/DL (ref 3.5–5)
ALP SERPL-CCNC: 52 U/L (ref 46–116)
ALP SERPL-CCNC: 55 U/L (ref 46–116)
ALT SERPL W P-5'-P-CCNC: 20 U/L (ref 12–78)
ALT SERPL W P-5'-P-CCNC: 21 U/L (ref 12–78)
ANION GAP SERPL CALCULATED.3IONS-SCNC: 8 MMOL/L (ref 4–13)
ANION GAP SERPL CALCULATED.3IONS-SCNC: 8 MMOL/L (ref 4–13)
AST SERPL W P-5'-P-CCNC: 16 U/L (ref 5–45)
AST SERPL W P-5'-P-CCNC: 16 U/L (ref 5–45)
BASOPHILS # BLD AUTO: 0.03 THOUSANDS/ΜL (ref 0–0.1)
BASOPHILS # BLD AUTO: 0.04 THOUSANDS/ΜL (ref 0–0.1)
BASOPHILS NFR BLD AUTO: 0 % (ref 0–1)
BASOPHILS NFR BLD AUTO: 0 % (ref 0–1)
BILIRUB SERPL-MCNC: 0.49 MG/DL (ref 0.2–1)
BILIRUB SERPL-MCNC: 0.52 MG/DL (ref 0.2–1)
BUN SERPL-MCNC: 11 MG/DL (ref 5–25)
BUN SERPL-MCNC: 14 MG/DL (ref 5–25)
CALCIUM SERPL-MCNC: 9.1 MG/DL (ref 8.3–10.1)
CALCIUM SERPL-MCNC: 9.3 MG/DL (ref 8.3–10.1)
CHLORIDE SERPL-SCNC: 103 MMOL/L (ref 100–108)
CHLORIDE SERPL-SCNC: 103 MMOL/L (ref 100–108)
CHOLEST SERPL-MCNC: 271 MG/DL (ref 50–200)
CO2 SERPL-SCNC: 26 MMOL/L (ref 21–32)
CO2 SERPL-SCNC: 26 MMOL/L (ref 21–32)
CREAT SERPL-MCNC: 0.65 MG/DL (ref 0.6–1.3)
CREAT SERPL-MCNC: 0.69 MG/DL (ref 0.6–1.3)
EOSINOPHIL # BLD AUTO: 0.05 THOUSAND/ΜL (ref 0–0.61)
EOSINOPHIL # BLD AUTO: 0.07 THOUSAND/ΜL (ref 0–0.61)
EOSINOPHIL NFR BLD AUTO: 0 % (ref 0–6)
EOSINOPHIL NFR BLD AUTO: 1 % (ref 0–6)
ERYTHROCYTE [DISTWIDTH] IN BLOOD BY AUTOMATED COUNT: 12.9 % (ref 11.6–15.1)
ERYTHROCYTE [DISTWIDTH] IN BLOOD BY AUTOMATED COUNT: 13 % (ref 11.6–15.1)
GFR SERPL CREATININE-BSD FRML MDRD: 124 ML/MIN/1.73SQ M
GFR SERPL CREATININE-BSD FRML MDRD: 126 ML/MIN/1.73SQ M
GLUCOSE P FAST SERPL-MCNC: 83 MG/DL (ref 65–99)
GLUCOSE SERPL-MCNC: 83 MG/DL (ref 65–140)
HCG SERPL QL: NEGATIVE
HCT VFR BLD AUTO: 39.2 % (ref 34.8–46.1)
HCT VFR BLD AUTO: 44.1 % (ref 34.8–46.1)
HDLC SERPL-MCNC: 55 MG/DL (ref 40–60)
HGB BLD-MCNC: 12.9 G/DL (ref 11.5–15.4)
HGB BLD-MCNC: 14.2 G/DL (ref 11.5–15.4)
IMM GRANULOCYTES # BLD AUTO: 0.03 THOUSAND/UL (ref 0–0.2)
IMM GRANULOCYTES # BLD AUTO: 0.05 THOUSAND/UL (ref 0–0.2)
IMM GRANULOCYTES NFR BLD AUTO: 0 % (ref 0–2)
IMM GRANULOCYTES NFR BLD AUTO: 0 % (ref 0–2)
LDLC SERPL CALC-MCNC: 181 MG/DL (ref 0–100)
LYMPHOCYTES # BLD AUTO: 3.57 THOUSANDS/ΜL (ref 0.6–4.47)
LYMPHOCYTES # BLD AUTO: 3.79 THOUSANDS/ΜL (ref 0.6–4.47)
LYMPHOCYTES NFR BLD AUTO: 26 % (ref 14–44)
LYMPHOCYTES NFR BLD AUTO: 30 % (ref 14–44)
MCH RBC QN AUTO: 30.3 PG (ref 26.8–34.3)
MCH RBC QN AUTO: 30.3 PG (ref 26.8–34.3)
MCHC RBC AUTO-ENTMCNC: 32.2 G/DL (ref 31.4–37.4)
MCHC RBC AUTO-ENTMCNC: 32.9 G/DL (ref 31.4–37.4)
MCV RBC AUTO: 92 FL (ref 82–98)
MCV RBC AUTO: 94 FL (ref 82–98)
MONOCYTES # BLD AUTO: 0.44 THOUSAND/ΜL (ref 0.17–1.22)
MONOCYTES # BLD AUTO: 0.45 THOUSAND/ΜL (ref 0.17–1.22)
MONOCYTES NFR BLD AUTO: 3 % (ref 4–12)
MONOCYTES NFR BLD AUTO: 4 % (ref 4–12)
NEUTROPHILS # BLD AUTO: 10.03 THOUSANDS/ΜL (ref 1.85–7.62)
NEUTROPHILS # BLD AUTO: 7.76 THOUSANDS/ΜL (ref 1.85–7.62)
NEUTS SEG NFR BLD AUTO: 65 % (ref 43–75)
NEUTS SEG NFR BLD AUTO: 71 % (ref 43–75)
NRBC BLD AUTO-RTO: 0 /100 WBCS
NRBC BLD AUTO-RTO: 0 /100 WBCS
PLATELET # BLD AUTO: 329 THOUSANDS/UL (ref 149–390)
PLATELET # BLD AUTO: 351 THOUSANDS/UL (ref 149–390)
PMV BLD AUTO: 10.3 FL (ref 8.9–12.7)
PMV BLD AUTO: 11.2 FL (ref 8.9–12.7)
POTASSIUM SERPL-SCNC: 3.6 MMOL/L (ref 3.5–5.3)
POTASSIUM SERPL-SCNC: 4 MMOL/L (ref 3.5–5.3)
PROT SERPL-MCNC: 7.7 G/DL (ref 6.4–8.2)
PROT SERPL-MCNC: 7.9 G/DL (ref 6.4–8.2)
RBC # BLD AUTO: 4.26 MILLION/UL (ref 3.81–5.12)
RBC # BLD AUTO: 4.69 MILLION/UL (ref 3.81–5.12)
SODIUM SERPL-SCNC: 137 MMOL/L (ref 136–145)
SODIUM SERPL-SCNC: 137 MMOL/L (ref 136–145)
TRIGL SERPL-MCNC: 175 MG/DL
TROPONIN I SERPL-MCNC: <0.02 NG/ML
TSH SERPL DL<=0.05 MIU/L-ACNC: 1.45 UIU/ML (ref 0.36–3.74)
TSH SERPL DL<=0.05 MIU/L-ACNC: 2.14 UIU/ML (ref 0.36–3.74)
WBC # BLD AUTO: 11.9 THOUSAND/UL (ref 4.31–10.16)
WBC # BLD AUTO: 14.41 THOUSAND/UL (ref 4.31–10.16)

## 2018-12-27 PROCEDURE — 93005 ELECTROCARDIOGRAM TRACING: CPT

## 2018-12-27 PROCEDURE — 99285 EMERGENCY DEPT VISIT HI MDM: CPT

## 2018-12-27 PROCEDURE — 99214 OFFICE O/P EST MOD 30 MIN: CPT | Performed by: FAMILY MEDICINE

## 2018-12-27 PROCEDURE — 84703 CHORIONIC GONADOTROPIN ASSAY: CPT

## 2018-12-27 PROCEDURE — 80061 LIPID PANEL: CPT

## 2018-12-27 PROCEDURE — 71046 X-RAY EXAM CHEST 2 VIEWS: CPT

## 2018-12-27 PROCEDURE — 80053 COMPREHEN METABOLIC PANEL: CPT | Performed by: EMERGENCY MEDICINE

## 2018-12-27 PROCEDURE — 84443 ASSAY THYROID STIM HORMONE: CPT | Performed by: FAMILY MEDICINE

## 2018-12-27 PROCEDURE — 85025 COMPLETE CBC W/AUTO DIFF WBC: CPT | Performed by: FAMILY MEDICINE

## 2018-12-27 PROCEDURE — 84484 ASSAY OF TROPONIN QUANT: CPT | Performed by: EMERGENCY MEDICINE

## 2018-12-27 PROCEDURE — 36415 COLL VENOUS BLD VENIPUNCTURE: CPT | Performed by: FAMILY MEDICINE

## 2018-12-27 PROCEDURE — 80053 COMPREHEN METABOLIC PANEL: CPT | Performed by: FAMILY MEDICINE

## 2018-12-27 PROCEDURE — 1036F TOBACCO NON-USER: CPT | Performed by: FAMILY MEDICINE

## 2018-12-27 PROCEDURE — 84443 ASSAY THYROID STIM HORMONE: CPT | Performed by: EMERGENCY MEDICINE

## 2018-12-27 PROCEDURE — 85025 COMPLETE CBC W/AUTO DIFF WBC: CPT | Performed by: EMERGENCY MEDICINE

## 2018-12-27 PROCEDURE — 93000 ELECTROCARDIOGRAM COMPLETE: CPT | Performed by: FAMILY MEDICINE

## 2018-12-27 RX ORDER — ACETAMINOPHEN 325 MG/1
975 TABLET ORAL ONCE
Status: COMPLETED | OUTPATIENT
Start: 2018-12-27 | End: 2018-12-27

## 2018-12-27 RX ORDER — HYDROXYZINE HYDROCHLORIDE 25 MG/1
25 TABLET, FILM COATED ORAL 2 TIMES DAILY PRN
Qty: 20 TABLET | Refills: 0 | Status: SHIPPED | OUTPATIENT
Start: 2018-12-27 | End: 2021-02-24 | Stop reason: ALTCHOICE

## 2018-12-27 RX ORDER — IBUPROFEN 400 MG/1
400 TABLET ORAL ONCE
Status: COMPLETED | OUTPATIENT
Start: 2018-12-27 | End: 2018-12-27

## 2018-12-27 RX ADMIN — ACETAMINOPHEN 975 MG: 325 TABLET, FILM COATED ORAL at 21:26

## 2018-12-27 RX ADMIN — IBUPROFEN 400 MG: 400 TABLET ORAL at 21:26

## 2018-12-27 NOTE — PROGRESS NOTES
FAMILY MEDICINE PROGRESS NOTE  Varun Tolentino 26 y o  female   DATE: December 27, 2018     ASSESSMENT and PLAN:  Varun Tolentino is a 26 y o  female with:     Absolute anemia  H/o MARILEE with her pregnancy 2 years ago, no recheck since delivery and she is experiencing fatigue  Encouraged to get labs done that were ordered at our last visit    Anxiety  Pt likely has underlying EDER recently exacerbated due to holidays and upcoming racing competition in [place]    -Encouraged to get labs done  -Trial of Atarax PRN, would start anti-depressant such as Lexapro after she returns from her trip if anxiety persists  -Consider CBT as well    PVC (premature ventricular contraction)  Likely related to anxiety, normal CV exam  EKG today showed occ PVCs, that correlated to palpitations she was having in the moment  Labs ordered at last visit (CBC, TSH, CMP), also check HCG, have pt get labs done now, if normal  -Check Holter x 48 hours    Patient agreeable with the plan and expressed understanding  I discucssed signs and symptoms for which to RTC, go to ER or seek urgent medical care  SUBJECTIVE:  Varun Tolentino is a 26 y o  female who presents today with a chief complaint of Rapid Heart Rate (started tuesday night); Nausea; Insomnia; and Chest Pain  Pt seen for a physical 1 month ago and was having EDER, recommended that she get blood work done, but didn't at that time  2 nights ago, she woke up in the middle of the night with heart palpitations, shakiness  She was feeling chest flutters and diffuse aches in her chest, not a sharp pain  She then couldn't fall asleep, the palpitations lasted about an hour and then couldn't sleep for another hour  She had palpitations one and off yesterday and today, but usually they'll go away if she does something  She had nausea with palpitations last night and she had no vomiting  No extra caffeine, no increased alcohol intake/cigarette/illicit drug use    No new medications, travel  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  Thinks she may be a little anxious about the prep before the trip  Has been busy lately because of the holidays as well  Review of Systems   Constitutional: Negative for fatigue  Respiratory: Positive for chest tightness  Negative for cough, choking and shortness of breath  Cardiovascular: Positive for chest pain and palpitations  Gastrointestinal: Positive for nausea  Negative for abdominal pain and vomiting  Neurological: Negative for dizziness, syncope, light-headedness and headaches  Psychiatric/Behavioral: Positive for sleep disturbance  The patient is nervous/anxious  All other systems reviewed and are negative  I have reviewed the patient's PMH, Social History, Medication List and Allergies as appropriate  OBJECTIVE:  /64 (BP Location: Left arm, Patient Position: Sitting, Cuff Size: Large)   Pulse 88   Temp 97 7 °F (36 5 °C)   Resp 16   Ht 5' 4" (1 626 m)   Wt 74 8 kg (164 lb 12 8 oz)   LMP 12/04/2018 (Exact Date)   Breastfeeding? No   BMI 28 29 kg/m²    Physical Exam   Constitutional: She appears well-developed and well-nourished  No distress  Neck: Normal range of motion  No thyromegaly present  Cardiovascular: Normal rate, regular rhythm and normal heart sounds  Pulmonary/Chest: Effort normal and breath sounds normal  No respiratory distress  She has no wheezes  She has no rales  Skin: She is not diaphoretic  Psychiatric: Her speech is normal and behavior is normal  Her mood appears anxious  Vitals reviewed  José Herman MD    Note: Portions of the record may have been created with voice recognition software  Occasional wrong word or "sound a like" substitutions may have occurred due to the inherent limitations of voice recognition software  Read the chart carefully and recognize, using context, where substitutions have occurred

## 2018-12-27 NOTE — ASSESSMENT & PLAN NOTE
H/o MARILEE with her pregnancy 2 years ago, no recheck since delivery and she is experiencing fatigue  Encouraged to get labs done that were ordered at our last visit

## 2018-12-27 NOTE — ASSESSMENT & PLAN NOTE
Likely related to anxiety, normal CV exam  EKG today showed occ PVCs, that correlated to palpitations she was having in the moment  Labs ordered at last visit (CBC, TSH, CMP), also check HCG, have pt get labs done now, if normal  -Check Holter x 48 hours    Patient agreeable with the plan and expressed understanding  I discucssed signs and symptoms for which to RTC, go to ER or seek urgent medical care

## 2018-12-27 NOTE — PATIENT INSTRUCTIONS
Anxiety   AMBULATORY CARE:   Anxiety  is a condition that causes you to feel extremely worried or nervous  The feelings are so strong that they can cause problems with your daily activities or sleep  Anxiety may be triggered by something you fear, or it may happen without a cause  Family or work stress, smoking, caffeine, and alcohol can increase your risk for anxiety  Certain medicines or health conditions can also increase your risk  Anxiety can become a long-term condition if it is not managed or treated  Common signs and symptoms that may occur with anxiety:   · Fatigue or muscle tightness     · Shaking, restlessness, or irritability     · Problems focusing     · Trouble sleeping     · Feeling jumpy, easily startled, or dizzy     · Rapid heartbeat or shortness of breath  Call 911 if:   · You have chest pain, tightness, or heaviness that may spread to your shoulders, arms, jaw, neck, or back  · You feel like hurting yourself or someone else  Contact your healthcare provider if:   · Your symptoms get worse or do not get better with treatment  · You think your medicine may be causing side effects  · Your anxiety keeps you from doing your regular daily activities  · You have new symptoms since your last visit  · You have questions or concerns about your condition or care  Treatment for anxiety  may include medicines to help you feel calm and relaxed, and decrease your symptoms  Medicines are usually given together with therapy or other treatments  Manage anxiety:   · Talk to someone about your anxiety  Your healthcare provider may suggest counseling  Cognitive behavioral therapy can help you understand and change how you react to events that trigger your symptoms  You might feel more comfortable talking with a friend or family member about your anxiety  Choose someone you know will be supportive and encouraging  · Find ways to relax    Activities such as exercise, meditation, or listening to music can help you relax  Spend time with friends, or do things you enjoy  · Practice deep breathing  Deep breathing can help you relax when you feel anxious  Focus on taking slow, deep breaths several times a day, or during an anxiety attack  Breathe in through your nose and out through your mouth  · Create a regular sleep routine  Regular sleep can help you feel calmer during the day  Go to sleep and wake up at the same times every day  Do not watch television or use the computer right before bed  Your room should be comfortable, dark, and quiet  · Eat a variety of healthy foods  Healthy foods include fruits, vegetables, low-fat dairy products, lean meats, fish, whole-grain breads, and cooked beans  Healthy foods can help you feel less anxious and have more energy  · Exercise regularly  Exercise can increase your energy level  Exercise may also lift your mood and help you sleep better  Your healthcare provider can help you create an exercise plan  · Do not smoke  Nicotine and other chemicals in cigarettes and cigars can increase anxiety  Ask your healthcare provider for information if you currently smoke and need help to quit  E-cigarettes or smokeless tobacco still contain nicotine  Talk to your healthcare provider before you use these products  · Do not have caffeine  Caffeine can make your symptoms worse  Do not have foods or drinks that are meant to increase your energy level  · Limit or do not drink alcohol  Ask your healthcare provider if alcohol is safe for you  You may not be able to drink alcohol if you take certain anxiety or depression medicines  Limit alcohol to 1 drink per day if you are a woman  Limit alcohol to 2 drinks per day if you are a man  A drink of alcohol is 12 ounces of beer, 5 ounces of wine, or 1½ ounces of liquor  · Do not use drugs  Drugs can make your anxiety worse  It can also make anxiety hard to manage   Talk to your healthcare provider if you use drugs and want help to quit  Follow up with your healthcare provider as directed:  Write down your questions so you remember to ask them during your visits  © 2017 [employer]  Information is for End User's use only and may not be sold, redistributed or otherwise used for commercial purposes  All illustrations and images included in CareNotes® are the copyrighted property of A D [employer] M , Inc  or [employer]  The above information is an  only  It is not intended as medical advice for individual conditions or treatments  Talk to your doctor, nurse or pharmacist before following any medical regimen to see if it is safe and effective for you  Heart Palpitations   WHAT YOU NEED TO KNOW:   Heart palpitations are feelings that your heart races, jumps, throbs, or flutters  You may feel extra beats, no beats for a short time, or skipped beats  You may have these feelings in your chest, throat, or neck  They may happen when you are sitting, standing, or lying  Heart palpitations may be frightening, but are usually not caused by a serious problem  DISCHARGE INSTRUCTIONS:   Call 911 or have someone else call for any of the following:   · You have any of the following signs of a heart attack:      ¨ Squeezing, pressure, or pain in your chest that lasts longer than 5 minutes or returns    ¨ Discomfort or pain in your back, neck, jaw, stomach, or arm     ¨ Trouble breathing    ¨ Nausea or vomiting    ¨ Lightheadedness or a sudden cold sweat, especially with chest pain or trouble breathing    · You have any of the following signs of a stroke:      ¨ Numbness or drooping on one side of your face     ¨ Weakness in an arm or leg    ¨ Confusion or difficulty speaking    ¨ Dizziness, a severe headache, or vision loss    · You faint or lose consciousness  Return to the emergency department if:   · Your palpitations happen more often or get more intense       Contact your healthcare provider if:   · You have new or worsening swelling in your feet or ankles  · You have questions or concerns about your condition or care  Follow up with your healthcare provider as directed: You may need to follow up with a cardiologist  Alisa Gonzalez may need tests to check for heart problems that cause palpitations  Write down your questions so you remember to ask them during your visits  Keep a record:  Write down when your palpitations start and stop, what you were doing when they started, and your symptoms  Keep track of what you ate or drank within a few hours of your palpitations  Include anything that seemed to help your symptoms, such as lying down or holding your breath  This record will help you and your healthcare provider learn what triggers your palpitations  Bring this record with you to your follow up visits  Help prevent heart palpitations:   · Manage stress and anxiety  Find ways to relax such as listening to music, meditating, or doing yoga  Exercise can also help decrease stress and anxiety  Talk to someone you trust about your stress or anxiety  You can also talk to a therapist      · Get plenty of sleep every night  Ask your healthcare provider how much sleep you need each night  · Do not drink caffeine or alcohol  Caffeine and alcohol can make your palpitations worse  Caffeine is found in soda, coffee, tea, chocolate, and drinks that increase your energy  · Do not smoke  Nicotine and other chemicals in cigarettes and cigars may damage your heart and blood vessels  Ask your healthcare provider for information if you currently smoke and need help to quit  E-cigarettes or smokeless tobacco still contain nicotine  Talk to your healthcare provider before you use these products  · Do not use illegal drugs  Talk to your healthcare provider if you use illegal drugs and want help to quit    © 2017 [employer] Information is for End User's use only and may not be sold, redistributed or otherwise used for commercial purposes  All illustrations and images included in CareNotes® are the copyrighted property of A D A M , Inc  or [employer]  The above information is an  only  It is not intended as medical advice for individual conditions or treatments  Talk to your doctor, nurse or pharmacist before following any medical regimen to see if it is safe and effective for you

## 2018-12-27 NOTE — ASSESSMENT & PLAN NOTE
Pt likely has underlying EDER recently exacerbated due to holidays and upcoming racing competition in [place]    -Encouraged to get labs done  -Trial of Atarax PRN, would start anti-depressant such as Lexapro after she returns from her trip if anxiety persists  -Consider CBT as well

## 2018-12-28 ENCOUNTER — TELEPHONE (OUTPATIENT)
Dept: FAMILY MEDICINE CLINIC | Facility: CLINIC | Age: 23
End: 2018-12-28

## 2018-12-28 DIAGNOSIS — I49.3 PVC (PREMATURE VENTRICULAR CONTRACTION): Primary | ICD-10-CM

## 2018-12-28 LAB
ATRIAL RATE: 106 BPM
P AXIS: 78 DEGREES
PR INTERVAL: 134 MS
QRS AXIS: 87 DEGREES
QRSD INTERVAL: 76 MS
QT INTERVAL: 342 MS
QTC INTERVAL: 454 MS
T WAVE AXIS: 50 DEGREES
VENTRICULAR RATE: 106 BPM

## 2018-12-28 PROCEDURE — 93010 ELECTROCARDIOGRAM REPORT: CPT | Performed by: INTERNAL MEDICINE

## 2018-12-28 RX ORDER — METOPROLOL SUCCINATE 25 MG/1
12.5 TABLET, EXTENDED RELEASE ORAL DAILY
Qty: 30 TABLET | Refills: 0 | Status: SHIPPED | OUTPATIENT
Start: 2018-12-28 | End: 2019-01-21 | Stop reason: SDUPTHER

## 2018-12-28 NOTE — TELEPHONE ENCOUNTER
I sent in a metoprolol, I want her to only do 1/2 tab every day and see if that helps her symptoms  If she does get the Holter test done, she should stop this medication prior to that so that we can get an accurate picture of her heart symptoms   thanks

## 2018-12-28 NOTE — TELEPHONE ENCOUNTER
Pt called back and received message  She is agreeable to starting a low dose bp medication   Pt uses [facility]

## 2018-12-28 NOTE — TELEPHONE ENCOUNTER
Patient informed, stated that she just had her BP checked with a manual cuff by her sister, who is an MA, and it was 120/80

## 2018-12-28 NOTE — DISCHARGE INSTRUCTIONS
Tachycardia   WHAT YOU NEED TO KNOW:   Tachycardia (fast heart rate) is when your heart rate is 100 beats per minute or more at rest  It is normal for the heart rate to increase with activity or exercise and then decrease when you stop  A fast heart rate at rest may be caused by any of the following:  · Anxiety, stress, or pain    · Fever    · Physical fatigue or strenuous exercise    · Large amounts of caffeine such as coffee, tea, and energy drinks    · Heavy alcohol use or cigarette smoking    · Some medicines, inhalers, or street drugs    · Increased thyroid hormone level  DISCHARGE INSTRUCTIONS:   Call 911 or have someone else call for any of the following:   · You have any of the following signs of a heart attack:     ¨ Squeezing, pressure, or pain in your chest that lasts longer than a few minutes  Chest pain may come and go  ¨ Pain in your jaw, neck, one or both arms, upper and lower back, or stomach  ¨ Shortness of breath, or panting    ¨ Nausea or vomiting    ¨ Lightheadedness    · You cannot be woken  Contact your healthcare provider if:   · Your pulse is faster than your healthcare provider said it should be  · You have frequent periods of a fast heart rate  · You feel weak or dizzy  · You have questions or concerns about your condition or care  Help prevent a fast heart rate:  · Decrease the amount of caffeine you drink  Caffeine can increase your heart rate  · Limit or do not drink alcohol  Alcohol can increase your heart rate  Ask your healthcare provider if it is safe for you to drink alcohol  Also ask how much is safe for you to drink  · Do not smoke  Nicotine and other chemicals in cigarettes can cause damage to your heart  Ask your healthcare provider for information if you currently smoke and need help to quit  E-cigarettes or smokeless tobacco still contain nicotine  Talk to your healthcare provider before you use these products  · Do not use illegal drugs  Drugs such as meth and cocaine can increase your heart rate  Talk to your healthcare provider if you use illegal drugs and want to quit  · Get more rest   Fatigue can cause your heart rate to increase  Ask your healthcare provider about the best exercise plan for you  · Learn ways to cope with stress  Stress, fear, and anxiety can cause a fast heart rate  Your healthcare provider may recommend relaxation techniques and deep breathing exercises  Your healthcare provider may recommend you talk to someone about your stress or anxiety, such as a counselor or a trusted friend  Check your pulse as directed: Your healthcare provider will show you how to check your pulse, and how often to check it  Write down how fast your pulse is and if it feels regular or like it is skipping beats  Also write down the activity you were doing if your heart rate is above 100  Bring the information with you to your follow-up appointment  Follow up with your healthcare provider as directed: You may need more tests  Write down your questions so you remember to ask them at your visit  © 2017 [address] Information is for End User's use only and may not be sold, redistributed or otherwise used for commercial purposes  All illustrations and images included in CareNotes® are the copyrighted property of [employer]  or Kris Dey  The above information is an  only  It is not intended as medical advice for individual conditions or treatments  Talk to your doctor, nurse or pharmacist before following any medical regimen to see if it is safe and effective for you

## 2018-12-28 NOTE — ED PROVIDER NOTES
History  Chief Complaint   Patient presents with    Rapid Heart Rate     pt states she can feel a rapid heart rate intermittently  reports having issues for some time and had an EKG that showed some PVCs  reports anxiety  denies chest pain currently   Anxiety     81-year-old female with past medical history of asthma who is presenting with palpitations  Patient describes this as a sensation that her heart is racing  The palpitations began on Amanda, 2 days ago  The palpitations are intermittent  Patient denies any associated chest pain or shortness of breath  No orthopnea, PND, or peripheral edema  Patient denies any subjective fever, chills, or unintentional weight loss  She has never had problems with palpitations in the past   Patient reports that she saw her PCP for this problem recently  Her PCP felt that there might be a component of anxiety but did not start a medication for anxiety  Patient does report feeling somewhat anxious due to the holidays, plans to travel to [place], and her son being sick recently  At her PCPs office, an EKG was performed which reportedly demonstrated PVCs  Her PCP ordered labs and a Holter monitor  Review systems is otherwise unremarkable  Assessment plan:  81-year-old female presenting palpitations  Patient noted to be tachycardic  EKG demonstrates occasional PVCs but otherwise shows sinus tachycardia  No other abnormalities on examination  Cardiac workup ordered by triage nurse was otherwise unremarkable  Will check TSH with reflex T4  Prior to Admission Medications   Prescriptions Last Dose Informant Patient Reported? Taking?    albuterol (PROVENTIL HFA,VENTOLIN HFA) 90 mcg/act inhaler More than a month at Unknown time Self Yes No   Sig: Inhale 2 puffs every 6 (six) hours   hydrOXYzine HCL (ATARAX) 25 mg tablet   No No   Sig: Take 1 tablet (25 mg total) by mouth 2 (two) times a day as needed for anxiety   norethindrone-ethinyl estradiol (Annabelle Haas 1/20) 1-20 MG-MCG per tablet 12/26/2018 at Unknown time Self Yes Yes   Sig: Take 1 tablet by mouth daily      Facility-Administered Medications: None       Past Medical History:   Diagnosis Date    Asthma        Past Surgical History:   Procedure Laterality Date    NO PAST SURGERIES         Family History   Problem Relation Age of Onset    No Known Problems Mother      I have reviewed and agree with the history as documented  Social History   Substance Use Topics    Smoking status: Never Smoker    Smokeless tobacco: Never Used    Alcohol use Yes      Comment: occasional        Review of Systems   Constitutional: Negative for diaphoresis, fever and unexpected weight change  HENT: Negative for congestion, rhinorrhea and sore throat  Eyes: Negative for pain, discharge and visual disturbance  Respiratory: Negative for cough, shortness of breath and wheezing  Cardiovascular: Positive for palpitations  Negative for chest pain and leg swelling  Gastrointestinal: Negative for abdominal pain, blood in stool, constipation, diarrhea, nausea and vomiting  Genitourinary: Negative for dysuria, flank pain and hematuria  Musculoskeletal: Negative for arthralgias and joint swelling  Skin: Negative for rash and wound  Allergic/Immunologic: Negative for environmental allergies and food allergies  Neurological: Negative for dizziness, seizures, weakness and numbness  Hematological: Negative for adenopathy  Psychiatric/Behavioral: Negative for confusion and hallucinations  The patient is nervous/anxious          Physical Exam  ED Triage Vitals   Temperature Pulse Respirations Blood Pressure SpO2   12/27/18 1846 12/27/18 1846 12/27/18 1846 12/27/18 1846 12/27/18 1846   98 4 °F (36 9 °C) (!) 128 20 141/85 98 %      Temp Source Heart Rate Source Patient Position - Orthostatic VS BP Location FiO2 (%)   12/27/18 1846 12/27/18 1943 -- 12/27/18 1943 --   Oral Monitor  Right arm       Pain Score 12/27/18 1846       No Pain           Orthostatic Vital Signs  Vitals:    12/27/18 1846 12/27/18 1943   BP: 141/85 142/70   Pulse: (!) 128 (!) 112       Physical Exam   Constitutional: She is oriented to person, place, and time  She appears well-developed and well-nourished  No distress  HENT:   Head: Normocephalic and atraumatic  Right Ear: External ear normal    Left Ear: External ear normal    Eyes: Pupils are equal, round, and reactive to light  Conjunctivae and EOM are normal    Neck: Normal range of motion  Neck supple  Cardiovascular: Normal heart sounds  An irregular rhythm present  Tachycardia present  No murmur heard  Pulmonary/Chest: Effort normal and breath sounds normal  No respiratory distress  She has no wheezes  She has no rales  Abdominal: Soft  Bowel sounds are normal  She exhibits no distension  There is no tenderness  There is no guarding  Musculoskeletal: Normal range of motion  She exhibits no edema or deformity  Neurological: She is alert and oriented to person, place, and time  No gross motor deficits noted  Cranial nerves II-XII are intact  Speech is fluent without dysarthria or aphasia  Skin: Skin is warm and dry  Psychiatric: She has a normal mood and affect  Her behavior is normal  Thought content normal    Nursing note and vitals reviewed  ED Medications  Medications   acetaminophen (TYLENOL) tablet 975 mg (975 mg Oral Given 12/27/18 2126)   ibuprofen (MOTRIN) tablet 400 mg (400 mg Oral Given 12/27/18 2126)       Diagnostic Studies  Results Reviewed     Procedure Component Value Units Date/Time    TSH, 3rd generation with Free T4 reflex [[id number]]  (Normal) Collected:  12/27/18 1859    Lab Status:  Final result Specimen:  Blood from Arm, Right Updated:  12/27/18 2045     TSH 3RD GENERATON 2 140 uIU/mL     Narrative:         Patients undergoing fluorescein dye angiography may retain small amounts of fluorescein in the body for 48-72 hours post procedure  Samples containing fluorescein can produce falsely depressed TSH values  If the patient had this procedure,a specimen should be resubmitted post fluorescein clearance  The recommended reference ranges for TSH during pregnancy are as follows:  First trimester 0 1 to 2 5 uIU/mL  Second trimester  0 2 to 3 0 uIU/mL  Third trimester 0 3 to 3 0 uIU/m      Troponin I [[id number]]  (Normal) Collected:  12/27/18 1859    Lab Status:  Final result Specimen:  Blood from Arm, Right Updated:  12/27/18 1930     Troponin I <0 02 ng/mL     Comprehensive metabolic panel [[id number]] Collected:  12/27/18 1859    Lab Status:  Final result Specimen:  Blood from Arm, Right Updated:  12/27/18 1929     Sodium 137 mmol/L      Potassium 3 6 mmol/L      Chloride 103 mmol/L      CO2 26 mmol/L      ANION GAP 8 mmol/L      BUN 11 mg/dL      Creatinine 0 65 mg/dL      Glucose 83 mg/dL      Calcium 9 3 mg/dL      AST 16 U/L      ALT 20 U/L      Alkaline Phosphatase 52 U/L      Total Protein 7 7 g/dL      Albumin 4 1 g/dL      Total Bilirubin 0 52 mg/dL      eGFR 126 ml/min/1 73sq m     Narrative:         National Kidney Disease Education Program recommendations are as follows:  GFR calculation is accurate only with a steady state creatinine  Chronic Kidney disease less than 60 ml/min/1 73 sq  meters  Kidney failure less than 15 ml/min/1 73 sq  meters      CBC and differential [[id number]]  (Abnormal) Collected:  12/27/18 1859    Lab Status:  Final result Specimen:  Blood from Arm, Right Updated:  12/27/18 1912     WBC 11 90 (H) Thousand/uL      RBC 4 26 Million/uL      Hemoglobin 12 9 g/dL      Hematocrit 39 2 %      MCV 92 fL      MCH 30 3 pg      MCHC 32 9 g/dL      RDW 12 9 %      MPV 10 3 fL      Platelets 155 Thousands/uL      nRBC 0 /100 WBCs      Neutrophils Relative 65 %      Immat GRANS % 0 %      Lymphocytes Relative 30 %      Monocytes Relative 4 %      Eosinophils Relative 1 %      Basophils Relative 0 %      Neutrophils Absolute 7 76 (H) Thousands/µL      Immature Grans Absolute 0 03 Thousand/uL      Lymphocytes Absolute 3 57 Thousands/µL      Monocytes Absolute 0 44 Thousand/µL      Eosinophils Absolute 0 07 Thousand/µL      Basophils Absolute 0 03 Thousands/µL                  X-ray chest 2 views   ED Interpretation by Federico Fowler MD (12/27 2052)   Chest x-ray independently reviewed  On my reading, no consolidation, effusion, or pneumothorax  Mediastinum is normal  No cardiomegaly  No obvious osseous abnormalities  Final Result by Carlos Puente MD (12/27 2121)      No acute cardiopulmonary disease  Findings concur with the preliminary report by the referring clinician already in PACS and/or our electronic record EPIC        Workstation performed: [id number]               Procedures  ECG 12 Lead Documentation  Date/Time: 12/27/2018 8:50 PM  Performed by: Nagi Post by: Osei Arias     Patient location:  ED  Previous ECG:     Previous ECG:  Compared to current    Comparison ECG info:  October 13, 2014    Similarity:  Changes noted    Comparison to cardiac monitor: Yes    Interpretation:     Interpretation: non-specific    Rate:     ECG rate:  106    ECG rate assessment: tachycardic    Rhythm:     Rhythm: sinus tachycardia    Ectopy:     Ectopy: PVCs      PVCs:  Infrequent  QRS:     QRS axis:  Normal    QRS intervals:  Normal  Conduction:     Conduction: normal    ST segments:     ST segments:  Normal  T waves:     T waves: normal            Phone Consults  ED Phone Contact    ED Course  ED Course as of Dec 28 0842   u Dec 27, 2018   1948 Pulse: (!) 128   1949 WBC: (!) 11 90   1949 Troponin I: <0 02   2051 TSH 3RD GENERATON: 2 140                               MDM  Number of Diagnoses or Management Options  PVC (premature ventricular contraction): new and requires workup  Sinus tachycardia by electrocardiogram: new and requires workup  Diagnosis management comments:     Differential diagnosis included but was not limited to anxiety, hypokalemia, anxiety, paroxysmal atrial fibrillation, atrial flutter, AVNRT, and hypothyroidism  Cardiac workup ordered by triage nurse was unremarkable  Electrolytes were within normal limits  TSH was normal   EKG demonstrated sinus tachycardia with occasional PVCs  While on telemetry, the patient did not have any arrhythmias, although she did have occasional PVCs  Patient has been following with her PCP regarding this problem  She has an outpatient Holter monitor scheduled  Her PCP also was considering starting treatment for anxiety  Return precautions were discussed  Patient verbalized understanding  Amount and/or Complexity of Data Reviewed  Clinical lab tests: reviewed and ordered  Tests in the radiology section of CPT®: ordered and reviewed  Decide to obtain previous medical records or to obtain history from someone other than the patient: yes  Obtain history from someone other than the patient: yes  Review and summarize past medical records: yes  Independent visualization of images, tracings, or specimens: yes    Risk of Complications, Morbidity, and/or Mortality  Presenting problems: moderate  Diagnostic procedures: minimal  Management options: minimal    Patient Progress  Patient progress: stable    CritCare Time    Disposition  Final diagnoses:   Sinus tachycardia by electrocardiogram   PVC (premature ventricular contraction)     Time reflects when diagnosis was documented in both MDM as applicable and the Disposition within this note     Time User Action Codes Description Comment    12/27/2018  9:09 PM Frandy Newman Add [R00 0] Sinus tachycardia by electrocardiogram     12/27/2018  9:09 PM Frandy Newman Add [I49 3] PVC (premature ventricular contraction)       ED Disposition     ED Disposition Condition Comment    Discharge  Austin Leblanc discharge to home/self care      Condition at discharge: Good        Follow-up Information     Follow up With Specialties Details Why Contact Info Additional Information    Brenda Lorenz MD Family Medicine Call As needed  [facility] Emergency Medicine Go to If symptoms worsen  [address] [facility] [address], [place], [address]          Discharge Medication List as of 12/27/2018  9:10 PM      CONTINUE these medications which have NOT CHANGED    Details   albuterol (PROVENTIL HFA,VENTOLIN HFA) 90 mcg/act inhaler Inhale 2 puffs every 6 (six) hours, Starting Mon 10/16/2017, Historical Med      norethindrone-ethinyl estradiol (JUNEL 1/20) 1-20 MG-MCG per tablet Take 1 tablet by mouth daily, Starting Fri 5/12/2017, Historical Med      hydrOXYzine HCL (ATARAX) 25 mg tablet Take 1 tablet (25 mg total) by mouth 2 (two) times a day as needed for anxiety, Starting Thu 12/27/2018, Normal           No discharge procedures on file  ED Provider  Attending physically available and evaluated Mikaela Shoulders  I managed the patient along with the ED Attending      Electronically Signed by         Surendra Strete MD  12/28/18 6771

## 2018-12-28 NOTE — TELEPHONE ENCOUNTER
Please call Antonio Bob and let her know that her labs were mostly normal, except her cholesterol is quite elevated, no meds at this time but something to monitor  I saw that she did end up having to go to the ER again  If she is still having palpitations, I can also send in a low dose BP med to help with the heart rate in addition to the anxiety medication I gave her yesterday  Thank you! Admission on 12/27/2018, Discharged on 12/27/2018   Component Date Value Ref Range Status    Sodium 12/27/2018 137  136 - 145 mmol/L Final    Potassium 12/27/2018 3 6  3 5 - 5 3 mmol/L Final    Chloride 12/27/2018 103  100 - 108 mmol/L Final    CO2 12/27/2018 26  21 - 32 mmol/L Final    ANION GAP 12/27/2018 8  4 - 13 mmol/L Final    BUN 12/27/2018 11  5 - 25 mg/dL Final    Creatinine 12/27/2018 0 65  0 60 - 1 30 mg/dL Final    Standardized to IDMS reference method    Glucose 12/27/2018 83  65 - 140 mg/dL Final      If the patient is fasting, the ADA then defines impaired fasting glucose as > 100 mg/dL and diabetes as > or equal to 123 mg/dL  Specimen collection should occur prior to Sulfasalazine administration due to the potential for falsely depressed results  Specimen collection should occur prior to Sulfapyridine administration due to the potential for falsely elevated results   Calcium 12/27/2018 9 3  8 3 - 10 1 mg/dL Final    AST 12/27/2018 16  5 - 45 U/L Final      Specimen collection should occur prior to Sulfasalazine administration due to the potential for falsely depressed results   ALT 12/27/2018 20  12 - 78 U/L Final      Specimen collection should occur prior to Sulfasalazine and/or Sulfapyridine administration due to the potential for falsely depressed results       Alkaline Phosphatase 12/27/2018 52  46 - 116 U/L Final    Total Protein 12/27/2018 7 7  6 4 - 8 2 g/dL Final    Albumin 12/27/2018 4 1  3 5 - 5 0 g/dL Final    Total Bilirubin 12/27/2018 0 52  0 20 - 1 00 mg/dL Final    eGFR 12/27/2018 126  ml/min/1 73sq m Final    WBC 12/27/2018 11 90* 4 31 - 10 16 Thousand/uL Final    RBC 12/27/2018 4 26  3 81 - 5 12 Million/uL Final    Hemoglobin 12/27/2018 12 9  11 5 - 15 4 g/dL Final    Hematocrit 12/27/2018 39 2  34 8 - 46 1 % Final    MCV 12/27/2018 92  82 - 98 fL Final    MCH 12/27/2018 30 3  26 8 - 34 3 pg Final    MCHC 12/27/2018 32 9  31 4 - 37 4 g/dL Final    RDW 12/27/2018 12 9  11 6 - 15 1 % Final    MPV 12/27/2018 10 3  8 9 - 12 7 fL Final    Platelets 72/56/1993 329  149 - 390 Thousands/uL Final    nRBC 12/27/2018 0  /100 WBCs Final    Neutrophils Relative 12/27/2018 65  43 - 75 % Final    Immat GRANS % 12/27/2018 0  0 - 2 % Final    Lymphocytes Relative 12/27/2018 30  14 - 44 % Final    Monocytes Relative 12/27/2018 4  4 - 12 % Final    Eosinophils Relative 12/27/2018 1  0 - 6 % Final    Basophils Relative 12/27/2018 0  0 - 1 % Final    Neutrophils Absolute 12/27/2018 7 76* 1 85 - 7 62 Thousands/µL Final    Immature Grans Absolute 12/27/2018 0 03  0 00 - 0 20 Thousand/uL Final    Lymphocytes Absolute 12/27/2018 3 57  0 60 - 4 47 Thousands/µL Final    Monocytes Absolute 12/27/2018 0 44  0 17 - 1 22 Thousand/µL Final    Eosinophils Absolute 12/27/2018 0 07  0 00 - 0 61 Thousand/µL Final    Basophils Absolute 12/27/2018 0 03  0 00 - 0 10 Thousands/µL Final    Troponin I 12/27/2018 <0 02  <=0 04 ng/mL Final      [employer] Chemistry analyzer 99% cutoff is > 0 04 ng/mL in network labs     o cTnI 99% cutoff is useful only when applied to patients in the clinical setting of myocardial ischemia   o cTnI 99% cutoff should be interpreted in the context of clinical history, ECG findings and possibly cardiac imaging to establish correct diagnosis  o cTnI 99% cutoff may be suggestive but clearly not indicative of a coronary event without the clinical setting of myocardial ischemia        TSH 3RD GENERATON 12/27/2018 2 140  0 358 - 3 740 uIU/mL Final    Using supplements with high doses of biotin 20 to more than 300 times greater than the adequate daily intake for adults of 30 mcg/day as established by the [place] [employer], can cause falsely depress results   Ventricular Rate 12/27/2018 106  BPM Final    Atrial Rate 12/27/2018 106  BPM Final    MD Interval 12/27/2018 134  ms Final    QRSD Interval 12/27/2018 76  ms Final    QT Interval 12/27/2018 342  ms Final    QTC Interval 12/27/2018 454  ms Final    P Axis 12/27/2018 78  degrees Final    QRS Axis 12/27/2018 87  degrees Final    T Wave Axis 12/27/2018 50  degrees Final   Appointment on 12/27/2018   Component Date Value Ref Range Status    Cholesterol 12/27/2018 271* 50 - 200 mg/dL Final      Cholesterol:       Desirable         <200 mg/dl       Borderline         200-239 mg/dl       High              >239           Triglycerides 12/27/2018 175* <=150 mg/dL Final      Triglyceride:     Normal          <150 mg/dl     Borderline High 150-199 mg/dl     High            200-499 mg/dl        Very High       >499 mg/dl    Specimen collection should occur prior to N-Acetylcysteine or Metamizole administration due to the potential for falsely depressed results   HDL, Direct 12/27/2018 55  40 - 60 mg/dL Final      HDL Cholesterol:       High    >60 mg/dL       Low     <41 mg/dL  Specimen collection should occur prior to Metamizole administration due to the potential for falsley depressed results   LDL Calculated 12/27/2018 181* 0 - 100 mg/dL Final      LDL Cholesterol:     Optimal           <100 mg/dl     Near Optimal      100-129 mg/dl     Above Optimal       Borderline High 130-159 mg/dl       High            160-189 mg/dl       Very High       >189 mg/dl         This screening LDL is a calculated result  It does not have the accuracy of the Direct Measured LDL in the monitoring of patients with hyperlipidemia and/or statin therapy  Direct Measure LDL (GAI496) must be ordered separately in these patients      Preg, Serum 12/27/2018 Negative  Negative Final

## 2018-12-28 NOTE — ED ATTENDING ATTESTATION
Yolette Vargas DO, saw and evaluated the patient  I have discussed the patient with the resident/non-physician practitioner and agree with the resident's/non-physician practitioner's findings, Plan of Care, and MDM as documented in the resident's/non-physician practitioner's note, except where noted  All available labs and Radiology studies were reviewed  At this point I agree with the current assessment done in the Emergency Department  I have conducted an independent evaluation of this patient a history and physical is as follows:    Patient was sent to the emergency department by her PCP for further evaluation of rapid palpitations that have been occurring intermittently, but more frequently recently, and nearly continuously for the past 3 days  The palpitations are associated with intermittent episodes of random side stickers described as momentary sharp pains in different parts of her ribs bilaterally as well as intermittent episodes of paresthesias/numbness in either bicep  No h/o similar symptoms  Her PCP gave her hydroxyzine for concern of anxiety induced symptoms  She is also scheduled to wear a Holter monitor starting January 8th 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 [place]  No recent travel or sick contacts  ROS: Denies f/c, HA, LH/dizziness, diaphoresis, SOB, abdominal pain, n/v/d  12 system ROS o/w negative  HEART score = 0  PE: NAD, appears comfortable, alert; PERRL, EOMI; MMM, no posterior oropharyngeal exudate, edema or erythema; HRR, no murmur, monitor shows sinus tachycardia at 110 bpm; lungs CTA w/o w/r/r, POx 97% on RA (nl); abdomen s/nt/nd, nl BS in all 4 quadrants; (-) LE edema or calf TTP, FROM extremities x4; skin p/w/d; CNs GI/NF, oriented  DDx: Chest Pain - GERD, costochondritis, less likely anxiety, ACS/MI, doubt pneumonia, pneumothorax or PE  A/P: Will check cardiac w/u, treat symptoms, reevaluate for disposition            Critical Care Time  [facility] Time    Procedures

## 2018-12-28 NOTE — TELEPHONE ENCOUNTER
The low dose of the BP medication likely won't affect her BP much, it is more for the palpitations/PVCs

## 2019-01-08 ENCOUNTER — HOSPITAL ENCOUNTER (OUTPATIENT)
Dept: NON INVASIVE DIAGNOSTICS | Facility: CLINIC | Age: 24
Discharge: HOME | End: 2019-01-08
Payer: COMMERCIAL

## 2019-01-08 DIAGNOSIS — R00.2 HEART PALPITATIONS: ICD-10-CM

## 2019-01-08 DIAGNOSIS — I49.3 PVC (PREMATURE VENTRICULAR CONTRACTION): ICD-10-CM

## 2019-01-08 PROCEDURE — 93226 XTRNL ECG REC<48 HR SCAN A/R: CPT

## 2019-01-08 PROCEDURE — 93225 XTRNL ECG REC<48 HRS REC: CPT

## 2019-01-11 PROCEDURE — 93227 XTRNL ECG REC<48 HR R&I: CPT | Performed by: INTERNAL MEDICINE

## 2019-01-13 ENCOUNTER — TELEPHONE (OUTPATIENT)
Dept: FAMILY MEDICINE CLINIC | Facility: CLINIC | Age: 24
End: 2019-01-13

## 2019-01-14 ENCOUNTER — TELEPHONE (OUTPATIENT)
Dept: FAMILY MEDICINE CLINIC | Facility: CLINIC | Age: 24
End: 2019-01-14

## 2019-01-14 NOTE — TELEPHONE ENCOUNTER
Patient informed, stated that she did not start on Toprol and  will monitor her symptoms   She does state that symptoms have subsided a little

## 2019-01-14 NOTE — TELEPHONE ENCOUNTER
Ok, great, since it is happening so infrequently, she doesn't have to be worried   The medication would just help with symptom control, so she can call with an update in a few weeks if she decides she wants to start the medication, thanks

## 2019-01-14 NOTE — TELEPHONE ENCOUNTER
----- Message from Varun Tolentino sent at 1/14/2019  8:40 AM EST -----  Regarding: Test Results Question  Contact: [phone]  I have a question about HOLTER MONITOR - 48 HOUR resulted on 1/11/19, 5:35 PM     Are these results normal based on your guidelines? I feel much better a majority of the time with the exception of PVCs occasionally

## 2019-01-14 NOTE — TELEPHONE ENCOUNTER
Please call Montse Cote and let her know that I reviewed her Holter results and it showed that mostly she was in NSR, but 1 4% she was having PACs or "skipped beats," like I had guessed and we had discussed during her visit  These did correlate with the symptoms she is recording  We have a few options, since it is not happening often, we can monitor, or continue/start her on a BB if she didn't already start the Toprol XL or have her see Cardiology to discuss if she prefers  Thank you!

## 2019-01-21 DIAGNOSIS — I49.3 PVC (PREMATURE VENTRICULAR CONTRACTION): ICD-10-CM

## 2019-01-21 RX ORDER — METOPROLOL SUCCINATE 25 MG/1
12.5 TABLET, EXTENDED RELEASE ORAL DAILY
Qty: 45 TABLET | Refills: 1 | Status: SHIPPED | OUTPATIENT
Start: 2019-01-21 | End: 2020-02-21 | Stop reason: ALTCHOICE

## 2019-01-23 ENCOUNTER — TELEPHONE (OUTPATIENT)
Dept: FAMILY MEDICINE CLINIC | Facility: CLINIC | Age: 24
End: 2019-01-23

## 2019-01-23 DIAGNOSIS — Z20.828 EXPOSURE TO INFLUENZA: Primary | ICD-10-CM

## 2019-01-23 NOTE — TELEPHONE ENCOUNTER
When did her symptoms start? Did she get the flu shot this year? Is she having any other URI symptoms? If she is, I'll empirically treat her for the flu, we dont have to test her unless she requests it

## 2019-01-23 NOTE — TELEPHONE ENCOUNTER
Patient called stating her son was diagnosed with the flu and she is starting to feel very tired and low grade fever of 99 4 and nausea  Patient would like to know if she should take sophie flu or should she get tested to to see if she has the flu  Please advise  Patient would like a return call

## 2019-01-24 RX ORDER — OSELTAMIVIR PHOSPHATE 75 MG/1
75 CAPSULE ORAL DAILY
Qty: 7 CAPSULE | Refills: 0 | Status: SHIPPED | OUTPATIENT
Start: 2019-01-24 | End: 2019-01-31

## 2019-01-24 NOTE — TELEPHONE ENCOUNTER
Since she is not having classic URI symptoms of flu, I will treat with Tamiflu, but the prophylactic dose, sent in  thanks

## 2019-01-24 NOTE — TELEPHONE ENCOUNTER
Spoke with patient  Her symptoms started yesterday  She did have a flu shot this year, but her son did as well  He is currently admitted with influenza A, rotavirus, asthma and an ear infection  (he is 2)  She said she has a temp of 99 4 nausea and feeling fatigued

## 2019-11-27 ENCOUNTER — IMMUNIZATIONS (OUTPATIENT)
Dept: FAMILY MEDICINE CLINIC | Facility: CLINIC | Age: 24
End: 2019-11-27
Payer: COMMERCIAL

## 2019-11-27 DIAGNOSIS — Z23 NEED FOR INFLUENZA VACCINATION: Primary | ICD-10-CM

## 2019-11-27 PROCEDURE — 90471 IMMUNIZATION ADMIN: CPT

## 2019-11-27 PROCEDURE — 90686 IIV4 VACC NO PRSV 0.5 ML IM: CPT

## 2020-02-18 ENCOUNTER — TELEPHONE (OUTPATIENT)
Dept: ADMINISTRATIVE | Facility: OTHER | Age: 25
End: 2020-02-18

## 2020-02-18 NOTE — TELEPHONE ENCOUNTER
Upon review of the In Basket request we were able to locate, review, and update the patient chart as requested for Pap Smear (HPV) aka Cervical Cancer Screening  Any additional questions or concerns should be emailed to the Practice Liaisons via [email or web address] email, please do not reply via In Basket      Thank you  Joan Hooper

## 2020-02-18 NOTE — TELEPHONE ENCOUNTER
----- Message from Conner Gurrola MA sent at 2/18/2020 10:51 AM EST -----  Hello    I found Patient's Pap smear  in care every where  Please update      Thank you

## 2020-02-21 ENCOUNTER — OFFICE VISIT (OUTPATIENT)
Dept: FAMILY MEDICINE CLINIC | Facility: CLINIC | Age: 25
End: 2020-02-21
Payer: COMMERCIAL

## 2020-02-21 VITALS
BODY MASS INDEX: 27.16 KG/M2 | TEMPERATURE: 97.8 F | RESPIRATION RATE: 16 BRPM | WEIGHT: 169 LBS | HEIGHT: 66 IN | HEART RATE: 111 BPM | DIASTOLIC BLOOD PRESSURE: 64 MMHG | SYSTOLIC BLOOD PRESSURE: 100 MMHG | OXYGEN SATURATION: 99 %

## 2020-02-21 DIAGNOSIS — J98.01 BRONCHOSPASM: ICD-10-CM

## 2020-02-21 DIAGNOSIS — E78.00 PURE HYPERCHOLESTEROLEMIA: ICD-10-CM

## 2020-02-21 DIAGNOSIS — F41.9 ANXIETY: ICD-10-CM

## 2020-02-21 DIAGNOSIS — Z00.00 ROUTINE ADULT HEALTH MAINTENANCE: Primary | ICD-10-CM

## 2020-02-21 DIAGNOSIS — I49.3 PVC (PREMATURE VENTRICULAR CONTRACTION): ICD-10-CM

## 2020-02-21 DIAGNOSIS — R10.11 COLICKY RUQ ABDOMINAL PAIN: ICD-10-CM

## 2020-02-21 DIAGNOSIS — R42 DIZZINESS: ICD-10-CM

## 2020-02-21 DIAGNOSIS — D72.828 OTHER ELEVATED WHITE BLOOD CELL (WBC) COUNT: ICD-10-CM

## 2020-02-21 PROBLEM — D64.9 ABSOLUTE ANEMIA: Status: RESOLVED | Noted: 2018-12-04 | Resolved: 2020-02-21

## 2020-02-21 PROCEDURE — 3008F BODY MASS INDEX DOCD: CPT | Performed by: FAMILY MEDICINE

## 2020-02-21 PROCEDURE — 99395 PREV VISIT EST AGE 18-39: CPT | Performed by: FAMILY MEDICINE

## 2020-02-21 RX ORDER — ALBUTEROL SULFATE 90 UG/1
2 AEROSOL, METERED RESPIRATORY (INHALATION) EVERY 6 HOURS
Qty: 1 INHALER | Refills: 2 | Status: SHIPPED | OUTPATIENT
Start: 2020-02-21

## 2020-02-21 NOTE — PROGRESS NOTES
Nolan [age] y o  female   DATE: February 21, 2020     Assessment and Plan:  26 y o  female exam      1  Health Maintenance  - Pap? 2/7/19-negative Pap, [id number]  - Labs: Pt declined STD testing, saw Becca Pollen today as well   - Immunizations: Reviewed  Recommend yearly flu vaccine-UTD 11/27/2019  TDaP UTD 7/12/2016  Recommended HPV series, pt will think about  2  Other diagnoses addressed today:   1  Routine adult health maintenance    2  PVC (premature ventricular contraction)  Normal CV exam, pt never started Metoprolol succinate 12 5mg daily, given improvement, can hold off for now  Limit caffeine intake    3  Anxiety  Continue Atarax PRN    4  Pure hypercholesterolemia  Lab Results   Component Value Date    CHOLESTEROL 271 (H) 12/27/2018    HDL 55 12/27/2018    LDLCALC 181 (H) 12/27/2018    TRIG 175 (H) 12/27/2018   Recheck, if LDL >190 or if US shows gallstones, would recommend Rx med  Reviewed healthy, low cholesterol diet  - Lipid Panel with Direct LDL reflex; Future    5  Other elevated white blood cell (WBC) count  Lab Results   Component Value Date    WBC 11 90 (H) 12/27/2018    HGB 12 9 12/27/2018    HCT 39 2 12/27/2018    MCV 92 12/27/2018     12/27/2018     Recheck CBC    6  Dizziness  Patient requesting A1c and additional lab test for intermittent dizzy spells  - Hemoglobin A1C; Future  - Comprehensive metabolic panel; Future  - CBC and differential; Future  - TSH, 3rd generation with Free T4 reflex; Future    7  Bronchospasm  Rare use, had asthma as a child, refilled today  - albuterol (PROVENTIL HFA,VENTOLIN HFA) 90 mcg/act inhaler; Inhale 2 puffs every 6 (six) hours  Dispense: 1 Inhaler; Refill: 2    8  Colicky RUQ abdominal pain  Post-prandial symptoms with known HLD, check RUQ US to rule out gall stones  - US abdomen complete; Future    Immunizations and preventive care screenings were discussed with patient today   Appropriate education was printed on patient's after visit summary  Counseling:  · Alcohol/drug use: discussed moderation in alcohol intake, the recommendations for healthy alcohol use, and avoidance of illicit drug use  · Dental Health: discussed importance of regular tooth brushing, flossing, and dental visits  · Injury prevention: discussed safety/seat belts, safety helmets, smoke detectors, carbon dioxide detectors, and smoking near bedding or upholstery  · Exercise: the importance of regular exercise/physical activity was discussed  Recommend exercise 3-5 times per week for at least 30 minutes  BMI Counseling: Body mass index is 27 28 kg/m²  The BMI is above normal  Nutrition recommendations include consuming healthier snacks and reducing intake of cholesterol  Exercise recommendations include exercising 3-5 times per week  Follow up next physical in 1 year  Subjective: Erna Dobbs is a 26 y o  female and is here for a comprehensive physical exam    Acute Complaints: Intermittent, colicky RUQ abd pain for the past 6 months, but now is happeneing 2-3x/week  Works at [employer] as an [place]  Has a son who is 15 years old    She has PVCs, never started Metoprolol, thinks those episodes are less frequent     Diet and Physical Activity  · Diet/Nutrition: does not follow a well balanced diet  Lately, has been less healthy  · Exercise: no formal exercise  General Health  · Vision: no vision problems and goes for regular eye exams  · Dental: regular dental visits  Gyn Health:  On OCPs, saw Gyn this morning Gennaro Sotelo with [facility])   Sexually active with boyfriend     Histories Updated and Reviewed 2/21/2020:  Patient's Medications   New Prescriptions    No medications on file   Previous Medications    HYDROXYZINE HCL (ATARAX) 25 MG TABLET    Take 1 tablet (25 mg total) by mouth 2 (two) times a day as needed for anxiety    NORETHINDRONE-ETHINYL ESTRADIOL (JUNEL 1/20) 1-20 MG-MCG PER TABLET    Take 1 tablet by mouth daily Modified Medications    Modified Medication Previous Medication    ALBUTEROL (PROVENTIL HFA,VENTOLIN HFA) 90 MCG/ACT INHALER albuterol (PROVENTIL HFA,VENTOLIN HFA) 90 mcg/act inhaler       Inhale 2 puffs every 6 (six) hours    Inhale 2 puffs every 6 (six) hours   Discontinued Medications    METOPROLOL SUCCINATE (TOPROL-XL) 25 MG 24 HR TABLET    Take 0 5 tablets (12 5 mg total) by mouth daily     No Known Allergies  Past Medical History:   Diagnosis Date    Asthma      Social History     Socioeconomic History    Marital status: Single     Spouse name: Not on file    Number of children: Not on file    Years of education: Not on file    Highest education level: Not on file   Occupational History     Employer: [address] [place] [address] [place] resource strain: Not on file    Food insecurity:     Worry: Not on file     Inability: Not on file    Transportation needs:     Medical: Not on file     Non-medical: Not on file   Tobacco Use    Smoking status: Never Smoker    Smokeless tobacco: Never Used   Substance and Sexual Activity    Alcohol use: Yes     Comment: occasional    Drug use: No    Sexual activity: Not on file   Lifestyle    Physical activity:     Days per week: Not on file     Minutes per session: Not on file    Stress: Not on file   Relationships    Social connections:     Talks on phone: Not on file     Gets together: Not on file     Attends Mosque service: Not on file     Active member of club or organization: Not on file     Attends meetings of clubs or organizations: Not on file     Relationship status: Not on file    Intimate partner violence:     Fear of current or ex partner: Not on file     Emotionally abused: Not on file     Physically abused: Not on file     Forced sexual activity: Not on file   Other Topics Concern    Not on file   Social History Narrative    Not on file     Immunization History   Administered Date(s) Administered    DTaP 5 1996, 1996, 1996, 10/28/1997, 08/22/2001    Fluzone Split Quad 0 25 mL 10/06/2016    Hep B, adult 1996, 1996, 1996    Hib (PRP-OMP) 1996, 1996, 1996, 06/06/1997    INFLUENZA 01/01/2016, 01/01/2016, 10/06/2016, 10/06/2016, 11/07/2018    IPV 1996, 1996, 10/28/1997, 08/22/2001    Influenza, Quadrivalent (nasal) 01/01/2016    Influenza, injectable, quadrivalent, preservative free 0 5 mL 11/07/2018, 11/27/2019    MMR 06/06/1997, 08/22/2001    Meningococcal, Unknown Serogroups 02/22/2012    Tdap 10/29/2007, 07/12/2016    Varicella 08/22/2001, 10/29/2007     Objective:  /64   Pulse (!) 111   Temp 97 8 °F (36 6 °C)   Resp 16   Ht 5' 6" (1 676 m)   Wt 76 7 kg (169 lb)   LMP 02/01/2020 (Exact Date)   SpO2 99%   Breastfeeding No   BMI 27 28 kg/m²   Physical Exam   Constitutional: She is oriented to person, place, and time  She appears well-developed and well-nourished  No distress  HENT:   Head: Normocephalic and atraumatic  Mouth/Throat: Oropharynx is clear and moist  No oropharyngeal exudate  Eyes: Pupils are equal, round, and reactive to light  EOM are normal  Right eye exhibits no discharge  Left eye exhibits no discharge  Neck: Normal range of motion  Neck supple  No JVD present  Cardiovascular: Normal rate, regular rhythm and normal heart sounds  No murmur heard  Pulmonary/Chest: Effort normal and breath sounds normal  No stridor  No respiratory distress  She has no wheezes  Abdominal: Soft  Bowel sounds are normal  There is no tenderness  There is no rebound and no guarding  Musculoskeletal: Normal range of motion  She exhibits no edema or tenderness  Neurological: She is alert and oriented to person, place, and time  Skin: Skin is warm and dry  She is not diaphoretic  No erythema  Psychiatric: She has a normal mood and affect  Her behavior is normal    Vitals reviewed  Patient Care Team:  Shelah Rands Charis Pallas, MD as PCP - General (Family Medicine)    Javier Verdin MD    Note: Portions of the record may have been created with voice recognition software  Occasional wrong word or "sound a like" substitutions may have occurred due to the inherent limitations of voice recognition software  Read the chart carefully and recognize, using context, where substitutions have occurred

## 2020-02-25 LAB — HBA1C MFR BLD HPLC: 5.5 %

## 2020-02-26 ENCOUNTER — TELEPHONE (OUTPATIENT)
Dept: FAMILY MEDICINE CLINIC | Facility: CLINIC | Age: 25
End: 2020-02-26

## 2020-02-26 PROBLEM — E87.6 HYPOKALEMIA: Status: ACTIVE | Noted: 2020-02-26

## 2020-02-26 NOTE — TELEPHONE ENCOUNTER
----- Message from Leigha Callaway MA sent at 2/26/2020  7:59 AM EST -----  Regarding: CC results      ----- Message -----  From: Horace, Transcription Incoming  Sent: 2/25/2020  11:38 PM EST  To: [address]

## 2021-02-24 ENCOUNTER — OFFICE VISIT (OUTPATIENT)
Dept: FAMILY MEDICINE CLINIC | Facility: CLINIC | Age: 26
End: 2021-02-24
Payer: COMMERCIAL

## 2021-02-24 VITALS
DIASTOLIC BLOOD PRESSURE: 60 MMHG | HEART RATE: 92 BPM | WEIGHT: 181 LBS | RESPIRATION RATE: 16 BRPM | HEIGHT: 66 IN | SYSTOLIC BLOOD PRESSURE: 110 MMHG | BODY MASS INDEX: 29.09 KG/M2 | TEMPERATURE: 97.8 F

## 2021-02-24 DIAGNOSIS — Z00.00 WELL ADULT EXAM: Primary | ICD-10-CM

## 2021-02-24 DIAGNOSIS — E78.00 HYPERCHOLESTEREMIA: ICD-10-CM

## 2021-02-24 PROBLEM — I49.1 PAC (PREMATURE ATRIAL CONTRACTION): Status: ACTIVE | Noted: 2021-02-24

## 2021-02-24 PROBLEM — F41.9 ANXIETY: Status: RESOLVED | Noted: 2018-12-04 | Resolved: 2021-02-24

## 2021-02-24 PROBLEM — E87.6 HYPOKALEMIA: Status: RESOLVED | Noted: 2020-02-26 | Resolved: 2021-02-24

## 2021-02-24 PROCEDURE — 99395 PREV VISIT EST AGE 18-39: CPT | Performed by: FAMILY MEDICINE

## 2021-02-24 PROCEDURE — 3725F SCREEN DEPRESSION PERFORMED: CPT | Performed by: FAMILY MEDICINE

## 2021-02-24 PROCEDURE — 1036F TOBACCO NON-USER: CPT | Performed by: FAMILY MEDICINE

## 2021-02-24 PROCEDURE — 3008F BODY MASS INDEX DOCD: CPT | Performed by: FAMILY MEDICINE

## 2021-02-24 NOTE — PROGRESS NOTES
Assessment/Plan:     Diagnoses and all orders for this visit:    Well adult exam    Hypercholesteremia  -     Lipid panel          Repeat lipid profile  She was not interested in a flu vaccine  I discussed Gardasil vaccine  She is scheduled for a pap smear  BMI Counseling: Body mass index is 29 57 kg/m²  The BMI is above normal  Nutrition recommendations include reducing portion sizes, decreasing overall calorie intake, consuming healthier snacks, moderation in carbohydrate intake, reducing intake of saturated fat and trans fat and reducing intake of cholesterol  Exercise recommendations include exercising 3-5 times per week  Patient ID: Andrew Alberts is a 23 y o  female  81-year-old female here for wellness exam   Medications reviewed  Hospitalizations/surgeries tonsillectomy/adenoidectomy    Social history nonsmoker  3year-old son  Family history hyperlipidemia mother  Type 2 diabetes mellitus paternal grandfather  Hyperlipidemia diet controlled  Last lipid profile 2020 cholesterol 217  Triglycerides 138  HDL 49    FBS 79  A1c 5 8  TSH 1 10  The following portions of the patient's history were reviewed and updated as appropriate: allergies, current medications, past family history, past medical history, past social history, past surgical history and problem list     Review of Systems   Constitutional: Positive for unexpected weight change (12 lb weight gain from 2020)  Negative for appetite change, chills, fatigue and fever  HENT: Negative for congestion, ear pain, rhinorrhea, sore throat and trouble swallowing  Eyes: Negative for visual disturbance  Respiratory: Negative for cough, shortness of breath and wheezing  S/p COV 19 infection 2021 with mild symptoms  Mild asthma diagnosed in childhood  Rare Albuterol use   Cardiovascular: Positive for palpitations  Negative for chest pain and leg swelling  Prior evaluation for palpitations  12/2018 EKG sinus tachycardia with PVCs nonspecific ST abnormality  01/2019 48 hour Holter monitor  Sinus rhythm with average heart rate 85 beats per minute  Relatively low-moderate burden of PACs  Patient symptoms of fluttering correlated with PACs  No PVCs seen  She was given a script for beta-blocker which she has not used  Gastrointestinal: Negative for abdominal pain, blood in stool, constipation, diarrhea, nausea and vomiting  Genitourinary: Negative for difficulty urinating  Pap smear 02/2019 normal  HIV testing 2016 negative  Musculoskeletal: Negative for arthralgias and myalgias  Skin: Negative for rash  Neurological: Negative for dizziness and headaches  Hematological: Negative for adenopathy  Does not bruise/bleed easily  Psychiatric/Behavioral: Negative for dysphoric mood and sleep disturbance  Objective:      /60   Pulse 92   Temp 97 8 °F (36 6 °C)   Resp 16   Ht 5' 5 6" (1 666 m)   Wt 82 1 kg (181 lb)   LMP 02/16/2021 (Exact Date)   Breastfeeding No   BMI 29 57 kg/m²     Wt Readings from Last 3 Encounters:   02/24/21 82 1 kg (181 lb)   02/21/20 76 7 kg (169 lb)   12/27/18 74 4 kg (164 lb)          Physical Exam  Vitals signs and nursing note reviewed  Constitutional:       General: She is not in acute distress  Appearance: She is well-developed  HENT:      Right Ear: Tympanic membrane and ear canal normal       Left Ear: Tympanic membrane and ear canal normal    Eyes:      General: No scleral icterus  Extraocular Movements: Extraocular movements intact  Conjunctiva/sclera: Conjunctivae normal       Pupils: Pupils are equal, round, and reactive to light  Neck:      Thyroid: No thyroid mass or thyromegaly  Vascular: No carotid bruit or JVD  Trachea: No tracheal deviation  Cardiovascular:      Rate and Rhythm: Normal rate and regular rhythm  Heart sounds: Normal heart sounds  No murmur  No gallop      Pulmonary: Effort: Pulmonary effort is normal  No respiratory distress  Breath sounds: Normal breath sounds  No wheezing or rales  Abdominal:      General: Bowel sounds are normal  There is no distension or abdominal bruit  Palpations: Abdomen is soft  There is no hepatomegaly, splenomegaly or mass  Tenderness: There is no abdominal tenderness  There is no guarding or rebound  Musculoskeletal:      Right lower leg: No edema  Left lower leg: No edema  Lymphadenopathy:      Cervical: No cervical adenopathy  Upper Body:      Right upper body: No supraclavicular adenopathy  Left upper body: No supraclavicular adenopathy  Skin:     Findings: No rash  Nails: There is no clubbing  Neurological:      General: No focal deficit present  Mental Status: She is alert and oriented to person, place, and time     Psychiatric:         Mood and Affect: Mood normal

## 2021-06-02 ENCOUNTER — TELEPHONE (OUTPATIENT)
Dept: FAMILY MEDICINE CLINIC | Facility: CLINIC | Age: 26
End: 2021-06-02

## 2021-06-02 DIAGNOSIS — R30.0 DYSURIA: Primary | ICD-10-CM

## 2021-06-02 RX ORDER — NITROFURANTOIN 25; 75 MG/1; MG/1
100 CAPSULE ORAL 2 TIMES DAILY
Qty: 10 CAPSULE | Refills: 0 | Status: SHIPPED | OUTPATIENT
Start: 2021-06-02 | End: 2021-06-07

## 2021-06-02 NOTE — TELEPHONE ENCOUNTER
Requested antibiotic  No fevers chills or rigors denies any blood in urine   If any problems or worsening of symptoms --> ER

## 2021-11-12 ENCOUNTER — OFFICE VISIT (OUTPATIENT)
Dept: FAMILY MEDICINE CLINIC | Facility: CLINIC | Age: 26
End: 2021-11-12
Payer: COMMERCIAL

## 2021-11-12 VITALS
HEIGHT: 66 IN | DIASTOLIC BLOOD PRESSURE: 70 MMHG | WEIGHT: 180 LBS | TEMPERATURE: 97.6 F | HEART RATE: 64 BPM | SYSTOLIC BLOOD PRESSURE: 110 MMHG | RESPIRATION RATE: 16 BRPM | BODY MASS INDEX: 28.93 KG/M2

## 2021-11-12 DIAGNOSIS — N30.00 ACUTE CYSTITIS WITHOUT HEMATURIA: Primary | ICD-10-CM

## 2021-11-12 LAB
SL AMB  POCT GLUCOSE, UA: ABNORMAL
SL AMB LEUKOCYTE ESTERASE,UA: 2
SL AMB POCT BILIRUBIN,UA: ABNORMAL
SL AMB POCT BLOOD,UA: 2
SL AMB POCT CLARITY,UA: ABNORMAL
SL AMB POCT COLOR,UA: YELLOW
SL AMB POCT KETONES,UA: ABNORMAL
SL AMB POCT NITRITE,UA: NEGATIVE
SL AMB POCT PH,UA: 5
SL AMB POCT SPECIFIC GRAVITY,UA: 1
SL AMB POCT URINE PROTEIN: ABNORMAL
SL AMB POCT UROBILINOGEN: 0.2

## 2021-11-12 PROCEDURE — 99213 OFFICE O/P EST LOW 20 MIN: CPT | Performed by: PHYSICIAN ASSISTANT

## 2021-11-12 PROCEDURE — 1036F TOBACCO NON-USER: CPT | Performed by: PHYSICIAN ASSISTANT

## 2021-11-12 PROCEDURE — 81002 URINALYSIS NONAUTO W/O SCOPE: CPT | Performed by: PHYSICIAN ASSISTANT

## 2021-11-12 PROCEDURE — 3008F BODY MASS INDEX DOCD: CPT | Performed by: PHYSICIAN ASSISTANT

## 2021-11-12 PROCEDURE — 87186 SC STD MICRODIL/AGAR DIL: CPT | Performed by: PHYSICIAN ASSISTANT

## 2021-11-12 PROCEDURE — 87077 CULTURE AEROBIC IDENTIFY: CPT | Performed by: PHYSICIAN ASSISTANT

## 2021-11-12 PROCEDURE — 87086 URINE CULTURE/COLONY COUNT: CPT | Performed by: PHYSICIAN ASSISTANT

## 2021-11-12 RX ORDER — NITROFURANTOIN 25; 75 MG/1; MG/1
100 CAPSULE ORAL 2 TIMES DAILY
Qty: 10 CAPSULE | Refills: 0 | Status: SHIPPED | OUTPATIENT
Start: 2021-11-12 | End: 2021-11-17

## 2021-11-12 RX ORDER — BETA CAROTENE, CHOLECALCIFEROL, DL-ALPHA TOCOPHERYL ACETATE, ASCORBIC ACID, FOLIC ACID, THIAMIN MONONITRATE, RIBOFLAVIN, NIACINAMIDE, PYRIDOXINE HYDROCHLORIDE, CYANOCOBALAMIN, CALCIUM CARBONATE, POTAS 120; 4000; 200; 400; 8; 29; 1; 20; 150; 3; 3; 3; 15 MG/1; [IU]/1; MG/1; [IU]/1; UG/1; MG/1; MG/1; MG/1; UG/1; MG/1; MG/1; MG/1; MG/1
1 TABLET, FILM COATED ORAL DAILY
COMMUNITY

## 2021-11-14 LAB — BACTERIA UR CULT: ABNORMAL

## 2021-11-16 ENCOUNTER — TELEPHONE (OUTPATIENT)
Dept: FAMILY MEDICINE CLINIC | Facility: CLINIC | Age: 26
End: 2021-11-16

## 2023-09-29 ENCOUNTER — RA CDI HCC (OUTPATIENT)
Dept: OTHER | Facility: HOSPITAL | Age: 28
End: 2023-09-29

## 2023-09-29 NOTE — PROGRESS NOTES
[address] [facility] coding opportunities       Chart reviewed, no opportunity found: CHART REVIEWED, NO OPPORTUNITY FOUND        Patients Insurance        Commercial Insurance: Baldemar Boyce

## 2023-11-03 ENCOUNTER — OFFICE VISIT (OUTPATIENT)
Dept: FAMILY MEDICINE CLINIC | Facility: CLINIC | Age: 28
End: 2023-11-03
Payer: COMMERCIAL

## 2023-11-03 VITALS
WEIGHT: 171.5 LBS | HEIGHT: 66 IN | RESPIRATION RATE: 17 BRPM | TEMPERATURE: 97.8 F | BODY MASS INDEX: 27.56 KG/M2 | HEART RATE: 81 BPM | DIASTOLIC BLOOD PRESSURE: 72 MMHG | SYSTOLIC BLOOD PRESSURE: 114 MMHG | OXYGEN SATURATION: 98 %

## 2023-11-03 DIAGNOSIS — E78.00 HYPERCHOLESTEREMIA: ICD-10-CM

## 2023-11-03 DIAGNOSIS — Z86.32 HISTORY OF GESTATIONAL DIABETES: ICD-10-CM

## 2023-11-03 DIAGNOSIS — Z00.00 ANNUAL PHYSICAL EXAM: Primary | ICD-10-CM

## 2023-11-03 DIAGNOSIS — F41.1 GENERALIZED ANXIETY DISORDER: ICD-10-CM

## 2023-11-03 PROCEDURE — 99395 PREV VISIT EST AGE 18-39: CPT | Performed by: FAMILY MEDICINE

## 2023-11-03 RX ORDER — FLUOXETINE 10 MG/1
10 CAPSULE ORAL DAILY
Qty: 30 CAPSULE | Refills: 1 | Status: SHIPPED | OUTPATIENT
Start: 2023-11-03

## 2023-11-03 NOTE — PROGRESS NOTES
[place]    NAME: Sandeep Staley  AGE: 33 y.o. SEX: female  : 1995     DATE: 11/3/2023     Assessment and Plan:     Problem List Items Addressed This Visit        Other    History of gestational diabetes    Relevant Orders    HEMOGLOBIN A1C W/ EAG ESTIMATION    Hypercholesteremia    Relevant Orders    Lipid panel   Other Visit Diagnoses     Annual physical exam    -  Primary    Generalized anxiety disorder        Relevant Medications    FLUoxetine (PROzac) 10 mg capsule    Other Relevant Orders    TSH, 3rd generation with Free T4 reflex    CBC and Platelet      Start patient on Prozac 10 mg daily. Discussed different medication options. Prozac is weight neutral so we will trial this. Sometimes medication can be activating which can worsen anxiety. If anxiety worsens discontinue medication. Follow-up in 6 weeks for recheck    Immunizations and preventive care screenings were discussed with patient today. Appropriate education was printed on patient's after visit summary. Counseling:  Alcohol/drug use: discussed moderation in alcohol intake, the recommendations for healthy alcohol use, and avoidance of illicit drug use. Dental Health: discussed importance of regular tooth brushing, flossing, and dental visits. Injury prevention: discussed safety/seat belts, safety helmets, smoke detectors, carbon dioxide detectors, and smoking near bedding or upholstery. Sexual health: discussed sexually transmitted diseases, partner selection, use of condoms, avoidance of unintended pregnancy, and contraceptive alternatives. Exercise: the importance of regular exercise/physical activity was discussed. Recommend exercise 3-5 times per week for at least 30 minutes. BMI Counseling: Body mass index is 28.02 kg/m².  The BMI is above normal. Nutrition recommendations include decreasing portion sizes, consuming healthier snacks, reducing intake of saturated and trans fat and reducing intake of cholesterol. Exercise recommendations include moderate physical activity 150 minutes/week. No pharmacotherapy was ordered. Patient referred to PCP. Rationale for BMI follow-up plan is due to patient being overweight or obese. Depression Screening and Follow-up Plan: Patient was screened for depression during today's encounter. They screened negative with a PHQ-2 score of 0. Return in about 6 weeks (around 12/15/2023) for 6 week recheck 15m Anxiety. Chief Complaint:     Chief Complaint   Patient presents with   • Physical Exam      History of Present Illness:     Adult Annual Physical   Patient here for a comprehensive physical exam. The patient reports problems - Weight gain . Diet and Physical Activity  Diet/Nutrition: poor diet, high fat diet, and consuming 3-5 servings of fruits/vegetables daily. Lots of carbs   Exercise: walking. Depression Screening  PHQ-2/9 Depression Screening    Little interest or pleasure in doing things: 0 - not at all  Feeling down, depressed, or hopeless: 0 - not at all  PHQ-2 Score: 0  PHQ-2 Interpretation: Negative depression screen       General Health  Sleep: sleeps well and gets 7-8 hours of sleep on average. Hearing: normal - bilateral.  Vision: no vision problems and most recent eye exam >1 year ago. Dental: regular dental visits. /GYN Health  Follow[facility] with [facility]         Review of Systems:     Review of Systems   Constitutional:  Negative for fatigue and fever. HENT:  Negative for sore throat. Eyes:  Negative for visual disturbance. Respiratory:  Negative for cough, chest tightness and shortness of breath. Cardiovascular:  Negative for chest pain, palpitations and leg swelling. Gastrointestinal:  Negative for abdominal pain, constipation, diarrhea and nausea. Endocrine: Negative for cold intolerance and heat intolerance. Genitourinary:  Negative for flank pain. Musculoskeletal:  Negative for back pain and neck pain. Skin:  Negative for rash. Neurological:  Negative for headaches. Psychiatric/Behavioral:  Negative for behavioral problems and confusion. Past Medical History:     Past Medical History:   Diagnosis Date   • Asthma       Past Surgical History:     Past Surgical History:   Procedure Laterality Date   • NO PAST SURGERIES        Social History:     Social History     Socioeconomic History   • Marital status: Single     Spouse name: None   • Number of children: None   • Years of education: None   • Highest education level: None   Occupational History     Employer: [employer]   Tobacco Use   • Smoking status: Never   • Smokeless tobacco: Never   Substance and Sexual Activity   • Alcohol use: Yes     Comment: occasional   • Drug use: Never   • Sexual activity: None   Other Topics Concern   • None   Social History Narrative    Checked Maria D     Social Determinants of Health     Financial Resource Strain: Not on file   Food Insecurity: Not on file   Transportation Needs: Not on file   Physical Activity: Not on file   Stress: Not on file   Social Connections: Not on file   Intimate Partner Violence: Not on file   Housing Stability: Not on file      Family History:     Family History   Problem Relation Age of Onset   • No Known Problems Mother       Current Medications:     Current Outpatient Medications   Medication Sig Dispense Refill   • albuterol (PROVENTIL HFA,VENTOLIN HFA) 90 mcg/act inhaler Inhale 2 puffs every 6 (six) hours 1 Inhaler 2   • FLUoxetine (PROzac) 10 mg capsule Take 1 capsule (10 mg total) by mouth daily 30 capsule 1     No current facility-administered medications for this visit.       Allergies:     No Known Allergies   Physical Exam:     /72 (BP Location: Left arm, Patient Position: Sitting, Cuff Size: Standard)   Pulse 81   Temp 97.8 °F (36.6 °C) (Tympanic)   Resp 17   Ht 5' 5.6" (1.666 m)   Wt 77.8 kg (171 lb 8 oz) SpO2 98%   BMI 28.02 kg/m²     Physical Exam  Vitals and nursing note reviewed. Constitutional:       Appearance: Normal appearance. She is well-developed. HENT:      Head: Normocephalic and atraumatic. Eyes:      Extraocular Movements: Extraocular movements intact. Pupils: Pupils are equal, round, and reactive to light. Cardiovascular:      Rate and Rhythm: Normal rate and regular rhythm. Pulmonary:      Effort: Pulmonary effort is normal.      Breath sounds: Normal breath sounds. Abdominal:      General: Bowel sounds are normal.      Palpations: Abdomen is soft. Musculoskeletal:      Cervical back: Normal range of motion. Skin:     General: Skin is warm and dry. Neurological:      General: No focal deficit present. Mental Status: She is alert and oriented to person, place, and time.    Psychiatric:         Mood and Affect: Mood normal.         Speech: Speech normal.         Behavior: Behavior normal.          Bharati Pinon MD   [address]

## 2023-11-06 ENCOUNTER — TELEPHONE (OUTPATIENT)
Dept: ADMINISTRATIVE | Facility: OTHER | Age: 28
End: 2023-11-06

## 2023-11-06 NOTE — TELEPHONE ENCOUNTER
----- Message from Yane Moser sent at 11/3/2023  1:07 PM EDT -----  11/03/23 1:07 PM    Hello, our patient Chapo Perez has followed with OB/GYN at [facility]  Please assist in updating the patient chart.     Thank you,  Woody TEMPLETON

## 2023-11-09 NOTE — TELEPHONE ENCOUNTER
Upon review of the In Basket request we were able to locate, review, and update the patient chart as requested for Pap Smear (HPV) aka Cervical Cancer Screening. Any additional questions or concerns should be emailed to the Practice Liaisons via the appropriate education email address, please do not reply via In Basket.     Thank you  Mekhi Ibarra

## 2023-11-17 DIAGNOSIS — F41.1 GENERALIZED ANXIETY DISORDER: ICD-10-CM

## 2023-11-17 RX ORDER — FLUOXETINE 10 MG/1
10 CAPSULE ORAL DAILY
Qty: 90 CAPSULE | Refills: 0 | Status: SHIPPED | OUTPATIENT
Start: 2023-11-17

## 2023-12-07 ENCOUNTER — RA CDI HCC (OUTPATIENT)
Dept: OTHER | Facility: HOSPITAL | Age: 28
End: 2023-12-07

## 2023-12-07 NOTE — PROGRESS NOTES
[address] [facility] coding opportunities       Chart reviewed, no opportunity found: CHART REVIEWED, NO OPPORTUNITY FOUND        Patients Insurance        Commercial Insurance: Baldemar Boyce

## 2024-01-09 ENCOUNTER — RA CDI HCC (OUTPATIENT)
Dept: OTHER | Facility: HOSPITAL | Age: 29
End: 2024-01-09

## 2024-01-19 ENCOUNTER — TELEPHONE (OUTPATIENT)
Age: 29
End: 2024-01-19

## 2024-01-19 ENCOUNTER — OFFICE VISIT (OUTPATIENT)
Dept: FAMILY MEDICINE CLINIC | Facility: CLINIC | Age: 29
End: 2024-01-19
Payer: COMMERCIAL

## 2024-01-19 VITALS
OXYGEN SATURATION: 98 % | TEMPERATURE: 98.3 F | HEART RATE: 98 BPM | SYSTOLIC BLOOD PRESSURE: 118 MMHG | HEIGHT: 66 IN | DIASTOLIC BLOOD PRESSURE: 74 MMHG | BODY MASS INDEX: 28.77 KG/M2 | WEIGHT: 179 LBS | RESPIRATION RATE: 18 BRPM

## 2024-01-19 DIAGNOSIS — L50.9 URTICARIA: Primary | ICD-10-CM

## 2024-01-19 DIAGNOSIS — F41.9 ANXIETY: ICD-10-CM

## 2024-01-19 PROCEDURE — 99214 OFFICE O/P EST MOD 30 MIN: CPT | Performed by: FAMILY MEDICINE

## 2024-01-19 NOTE — PROGRESS NOTES
Name: Shannan Patterson      : 1995      MRN: [id number]  Encounter Provider: Lester Macias MD  Encounter Date: 2024   Encounter department: [facility]    Assessment & Plan     1. Urticaria    2. Anxiety  -     Ambulatory referral to Psych Services; Future      Rash consistent with urticaria.  No new exposures, body wash or changes in detergent.  No recent travel.  No one else in the house has the rash.  Had similar reactions with her dog in the past.  Continue with Zyrtec daily.  Recommend adding Pepcid 20 mg twice daily for the next 2 weeks.  Continue to monitor anxiety.  I do recommend establishing with a therapist and continuing with relaxation techniques.  Will hold off on medication due to patient's desire to conceive this summer.    Follow-up if no improvement       Subjective      Has not started medication for anxiety.  Is currently switching jobs.  Does feel anxiety has improved since leaving her old job. Sh    Rash on left flank. Welts when scratching.   Free and clear       Review of Systems   Constitutional:  Negative for activity change, fatigue and fever.   Eyes:  Negative for visual disturbance.   Respiratory:  Negative for shortness of breath.    Cardiovascular:  Negative for chest pain.   Gastrointestinal:  Negative for abdominal pain, constipation, diarrhea and nausea.   Endocrine: Negative for cold intolerance and heat intolerance.   Musculoskeletal:  Negative for back pain.   Skin:  Positive for color change and rash.   Neurological:  Negative for headaches.   Psychiatric/Behavioral:  Negative for confusion. The patient is nervous/anxious.        Current Outpatient Medications on File Prior to Visit   Medication Sig   • albuterol (PROVENTIL HFA,VENTOLIN HFA) 90 mcg/act inhaler Inhale 2 puffs every 6 (six) hours   • [DISCONTINUED] FLUoxetine (PROzac) 10 mg capsule TAKE 1 CAPSULE BY MOUTH EVERY DAY       Objective     /74 (BP Location: Left arm, Patient  "Position: Sitting, Cuff Size: Standard)   Pulse 98   Temp 98.3 °F (36.8 °C)   Resp 18   Ht 5' 5.6\" (1.666 m)   Wt 81.2 kg (179 lb)   SpO2 98%   BMI 29.24 kg/m²     Physical Exam  Vitals and nursing note reviewed.   Constitutional:       Appearance: She is well-developed.   HENT:      Head: Normocephalic and atraumatic.   Cardiovascular:      Rate and Rhythm: Normal rate and regular rhythm.   Pulmonary:      Effort: Pulmonary effort is normal.      Breath sounds: Normal breath sounds.   Skin:     Findings: Rash present. Rash is urticarial.   Neurological:      General: No focal deficit present.      Mental Status: She is alert.   Psychiatric:         Mood and Affect: Mood normal.         Behavior: Behavior normal.       Lester Macias MD    "

## 2024-01-23 ENCOUNTER — TELEPHONE (OUTPATIENT)
Dept: PSYCHIATRY | Facility: CLINIC | Age: 29
End: 2024-01-23

## 2024-01-23 NOTE — TELEPHONE ENCOUNTER
Received response regarding referral letter via My-chart from patient, interested in therapy, no provider preference at the [facility] location.   Patient was added accordingly to wait-list and referral will be closed at this time .

## 2024-02-21 ENCOUNTER — TELEPHONE (OUTPATIENT)
Dept: PSYCHIATRY | Facility: CLINIC | Age: 29
End: 2024-02-21

## 2024-02-21 NOTE — TELEPHONE ENCOUNTER
"Behavioral Health Outpatient Intake Questions    Referred By   : PCP    Please advise interviewee that they need to answer all questions truthfully to allow for best care, and any misrepresentations of information may affect their ability to be seen at this clinic   => Was this discussed? Yes     If Minor Child (under age 18)    Who is/are the legal guardian(s) of the child?     Is there a custody agreement?      If \"YES\"- Custody orders must be obtained prior to scheduling the first appointment  In addition, Consent to Treatment must be signed by all legal guardians prior to scheduling the first appointment    If \"NO\"- Consent to Treatment must be signed by all legal guardians prior to scheduling the first appointment    Behavioral Health Outpatient Intake History -     Presenting Problem (in patient's own words): Pt reports anxiety     Are there any communication barriers for this patient?     No                                               If yes, please describe barriers:   If there is a unique situation, please refer to Emmanuel Meade/Jessica Paredes for final determination.    Are you taking any psychiatric medications? No     If \"YES\" -What are they      If \"YES\" -Who prescribes?     Has the Patient previously received outpatient Talk Therapy or Medication Management from [facility]  No        If \"YES\"- When, Where and with Whom?         If \"NO\" -Has Patient received these services elsewhere?       If \"YES\" -When, Where, and with Whom?    Has the Patient abused alcohol or other substances in the last 6 months ? No  No concerns of substance abuse are reported.     If \"YES\" -What substance, How much, How often?     If illegal substance: Refer to [facility] (for [facility]RIST[facility]) or [facility].   If Alcohol in excess of 10 drinks per week:  Refer to [facility] (for [facility]) or [facility]    Legal History-     Is this treatment court ordered? No   If \"yes \"send to :  Talk Therapy : Send to Emmanuel Meade/Jessica Paredes " "for final determination   Med Management: Send to Dr Schreiber for final determination     Has the Patient been convicted of a felony?  No   If \"Yes\" send to -When, What?  Talk Therapy : Send to Emmanuel Meade/Jessica Paredes for final determination   Med Management: Send to Dr Schreiber for final determination     ACCEPTED as a patient Yes   If \"Yes\" Appointment Date: 03/20/24 at 10:00am with Janeth Bower    Referred Elsewhere?   If “Yes” - (Where? Ex: [facility], [facility], [facility], [facility], etc.)       Name of Insurance Co: [facility]   Insurance ID#[id number]   Insurance Phone #  If ins is primary or secondary?Primary   If patient is a minor, parents information such as Name, D.O.B of guarantor.  "

## 2024-03-20 ENCOUNTER — SOCIAL WORK (OUTPATIENT)
Dept: BEHAVIORAL/MENTAL HEALTH CLINIC | Facility: CLINIC | Age: 29
End: 2024-03-20

## 2024-03-20 DIAGNOSIS — F41.9 ANXIETY: Primary | ICD-10-CM

## 2024-03-20 NOTE — PSYCH
Behavioral Health Psychotherapy Assessment    Date of Initial Psychotherapy Assessment: 03/21/24  Referral Source: PCP  Has a release of information been signed for the referral source? No    Preferred Name: Shannan Patterson  Preferred Pronouns: She/her  YOB: 1995 Age: 29 y.o.  Sex assigned at birth: female   Gender Identity: female  Race:   Preferred Language: English    Emergency Contact:  Full Name: Kenya Patterson   Relationship to Client: mother  Contact information: [phone]     Primary Care Physician:  Lester Macias MD  [address] [place] [id number]  [phone]  Has a release of information been signed? No    Physical Health History:  Past surgical procedures: c section, wisdom teeth removed, tonsils and adenoids removed  Do you have a history of any of the following: heart/cardiac issues and diabetes PVCs due to anxiety, gestational diabetes (gone now)  Do you have any mobility issues? No    Relevant Family History:  Sister has h/o seizures and anxiety    Presenting Problem (What brings you in?)  Shannan presents to therapy for assistance with managing her anxiety. Shannan said she wants to try talk therapy and avoid taking anxiety medication if possible (she discussed this with her PCP). Pt identifies her anxiety starting with pregnancy of her first son. She then had a miscarriage followed by a very stressful second pregnancy with her second son: she had multiple bleeds and needed an emergency c section. Pt and her partner want to have 3rd baby and Shannan wants to be able to manage her anxiety in case she has another difficult pregnancy. Pt's 6 y/o son has also struggled with some mental health and behavioral issues since he started going to  as a toddler.  would call Shannan to come pick him up shortly after she had dropped him off because he was acting out, etc. He would resist going to school. He was on small dose of Prozac for a while. 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 She wants to deal with feeling overwhelmed and manage her anxiety symptoms.     Mental Health Advance Directive:  Do you currently have a Mental Health Advance Directive?no    Diagnosis:   Diagnosis ICD-10-CM Associated Orders   1. Anxiety  F41.9           Initial Assessment:     Current Mental Status:    Appearance: appropriate and casual      Behavior/Manner: cooperative      Affect/Mood:  Anxious and happy    Speech:  Normal    Sleep:  Normal    Oriented to: oriented to self, oriented to place and oriented to time       Clinical Symptoms    Anxiety: yes      Depression Symptoms: irritable      Anxiety Symptoms: irritable, nervous/anxious and chest tightness      Have you ever been assaultive to others or the environment: No      Have you ever been self-injurious: No      Counseling History:  Previous Counseling or Treatment  (Mental Health or Drug & Alcohol): No    Have you previously taken psychiatric medications: No      Suicide Risk Assessment  Have you ever had a suicide attempt: No    Have you had incidents of suicidal ideation: No    Are you currently experiencing suicidal thoughts: No      Substance Abuse/Addiction Assessment:  Alcohol: Yes    Frequency:  Other  Other frequency:  Socially/on occasion  Heroin: No    Fentanyl: No    Opiates: No    Cocaine: No    Amphetamines: No    Hallucinogens: No    Club Drugs: No    Benzodiazepines: No    Other Rx Meds: No    Marijuana: No    Tobacco/Nicotine: No    Have you experienced blackouts as a result of substance use: No    Have you had any periods of abstinence: Yes    Have you experienced symptoms of withdrawal: No    Have you ever overdosed on any substances?: No    Are you currently using any Medication Assisted Treatment for Substance Use: No      Disordered Eating History:  Do you have a history of disordered eating: No      Social Determinants of Health:     SDOH:  Stress    Trauma and Abuse History:    Have you ever been abused: No      Legal History:    Have you ever been arrested  or had a DUI: No      Have you been incarcerated: No      Are you currently on parole/probation: No      Any current Children and Youth involvement: No      Any pending legal charges: No      Relationship History:    Current marital status: single      Natural Supports:  Other, mother, father and siblings    Other natural supports:  Boyfriend    Employment History    Are you currently employed: Yes      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    Future work goals:  Studying diagnostic medical stenography with goal of becoming ultrasound tech    Sources of income/financial support:  Work     History:      Status: no history of  duty  Educational History:     Have you ever been diagnosed with a learning disability: No      Highest level of education:  Associates Degree    School attended/attending:  Graduated from [employer] and got Associate's from [employer] in [place]    Have you ever had an IEP or 504-plan: No      Do you need assistance with reading or writing: No      Recommended Treatment:     Psychotherapy:  Individual sessions    Frequency:  2 times    Session frequency:  Monthly    Visit start and stop times:    03/20/24  Start Time: 1000  Stop Time: 1055  Total Visit Time: 55 minutes

## 2024-04-08 ENCOUNTER — TELEMEDICINE (OUTPATIENT)
Dept: BEHAVIORAL/MENTAL HEALTH CLINIC | Facility: CLINIC | Age: 29
End: 2024-04-08

## 2024-04-08 DIAGNOSIS — F41.9 ANXIETY: Primary | ICD-10-CM

## 2024-04-09 NOTE — BH TREATMENT PLAN
"Outpatient Behavioral Health Psychotherapy Treatment Plan    Shannan Patterson  1995     Date of Initial Psychotherapy Assessment: 3/20/24   Date of Current Treatment Plan: 04/08/24  Treatment Plan Target Date: 9/8/24  Treatment Plan Expiration Date: 10/5/24    Diagnosis:   1. Anxiety            Area(s) of Need: anxiety symptom reduction and management    Long Term Goal 1 (in the client's own words): \"I want to manage my anxiety and not let it get to me.\"    Stage of Change: Preparation    Target Date for completion: 9/8/24     Anticipated therapeutic modalities: client-centered therapy, supportive psychotherapy, mindfulness based strategies       People identified to complete this goal: Shannan with assistance from \[facility]\""      Objective 1: (identify the means of measuring success in meeting the objective): Shannan will describe situations, thoughts, feelings, and actions associated with her anxieties and worries, their impact on her functioning, and her attempts to resolve them at least 4 times a week for the next 6 months.      Objective 2: (identify the means of measuring success in meeting the objective): Shannan will learn and implement calming skills to reduce her overall anxiety and manage anxiety symptoms. W will teach Shannan calming and relaxation techniques at least 50 percent of the time for the next 6 months.       Long Term Goal 2 (in the client's own words): \"I want to learn about anxiety so that I can manage it better.\"    Stage of Change: Preparation    Target Date for completion: 9/8/24     Anticipated therapeutic modalities: client-centered therapy, supportive psychotherapy      People identified to complete this goal: Shannan with assistance from \[facility]\""       Objective 1: (identify the means of measuring success in meeting the objective): LSW will provide psychoeducation about the cognitive, physiological, and behavioral components of anxiety. Shannan will identify the cognitive, physiological, and " behavioral components of anxiety as they apply to her/how she experiences them.       Objective 2: (identify the means of measuring success in meeting the objective): Shannan will verbalize an understanding of the cognitive, physiological, and behavioral components of anxiety and its treatment.         I am currently under the care of a [facility] psychiatric provider: no    My [facility] psychiatric provider is: n/a    I am currently taking psychiatric medications: No    For children and adults who have a legal guardian:   Has there been any change to custody orders and/or guardianship status? NA. If yes, attach updated documentation.    I have created my Crisis Plan and have been offered a copy of this plan    Behavioral Health Treatment Plan [facility]: Diagnosis and Treatment Plan explained to Shannan Patterson acknowledges an understanding of their diagnosis. Shannan Patterson agrees to this treatment plan.    I have been offered a copy of this Treatment Plan. yes

## 2024-04-09 NOTE — PSYCH
"Behavioral Health Psychotherapy Progress Note    Psychotherapy Provided: Individual Psychotherapy     1. Anxiety          DATA: Shannan stated that her mood has been better since last session. Pt said she is still stressed with school, but not as much as at last session when she was straightening out her financial aid. 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 Shannan said her grandma went to the ED twice which was stressful, but she was ultimately OK.   During this session, this clinician used the following therapeutic modalities: Client-centered Therapy and Supportive Psychotherapy    Substance Abuse was not addressed during this session. If the client is diagnosed with a co-occurring substance use disorder, please indicate any changes in the frequency or amount of use: n/a. Stage of change for addressing substance use diagnoses: No substance use/Not applicable    ASSESSMENT:  Shannan Patterson presents with a Euthymic/ normal mood. her affect is Normal range and intensity, which is congruent, with her mood and the content of the session. The client has not made progress on their goals (we just set them today). Shannan Patterson presents with a minimal risk of suicide, minimal risk of self-harm, and minimal risk of harm to others.    For any risk assessment that surpasses a \"low\" rating, a safety plan must be developed.    A safety plan was indicated: no  If yes, describe in detail n/a    PLAN: Between sessions, Shannan Patterson will practice self care and monitor her anxiety symptoms. At the next session, the therapist will use Client-centered Therapy and Supportive Psychotherapy to address managing pt's anxiety and stress level.    Behavioral Health Treatment Plan and Discharge Planning: Shannan Patterson is aware of and agrees to continue to work on their treatment plan. They have identified and " are working toward their discharge goals. yes    Visit start and stop times:    04/09/24  Start Time: 0800  Stop Time: 0848  Total Visit Time: 48 minutes    Virtual Regular Visit    Verification of patient location:    Patient is located at Home in the following state in which I hold an active license [place]    Encounter provider BETTY Jiang    Provider located at [facility] [place]  [facility] [place]  [address]  [place] [id number] [phone]    The patient was identified by name and date of birth. Shannan Patterson was informed that this is a telemedicine visit and that the visit is being conducted throughthe [employer] platform. She agrees to proceed. My office door was closed. No one else was in the room.  She acknowledged consent and understanding of privacy and security of the video platform. The patient has agreed to participate and understands they can discontinue the visit at any time.    Patient is aware this is a billable service.

## 2024-04-09 NOTE — BH CRISIS PLAN
Client Name: Shannan Patterson       Client YOB: 1995    [place] Safety Plan      Creation Date: 4/8/24 Update Date: 4/8/25   Created By: BETTY Jiang       Step 1: Warning Signs:   Warning Signs   heart races   get agitated easily            Step 2: Internal Coping Strategies:   Internal Coping Strategies   breathing techniques   listen to music   go for a walk            Step 3: People and social settings that provide distraction:   Name Contact Information   mom in pt's phone   boyfriend in pt's phone   sisters in pt's phone   best friends in pt's phone    Places   go out to dinner with friends           Step 4: People whom I can ask for help during a crisis:      Name Contact Information    mom in pt's phone    boyfriend in pt's phone    sisters in pt's phone      Step 5: Professionals or agencies I can contact during a crisis:      Clinican/Agency Name Phone Emergency Contact    [facility], [facility] [phone]       [facility] Emergency Department Emergency Department Phone Emergency Department Address     911         Crisis Phone Numbers:   Suicide Prevention Lifeline: Call or Text  477 Crisis Text Line: Text HOME to 490-669   Please note: Some [facility] do not have a separate number for Child/Adolescent specific crisis. If your county is not listed under Child/Adolescent, please call the adult number for your county      Adult Crisis Numbers: Child/Adolescent Crisis Numbers   [facility]: [phone] [facility]: [phone]   [facility]: [phone] [facility]: [phone]   Psychiatric: [phone] [place]: [phone]   [facility]: [phone] [place]: [phone]   [place] [facility]: [phone]   [facility]: [phone]   [facility]: [phone]   [facility]: [phone] (daytime) [phone] (after hours, weekends, holidays)      Step 6: Making the environment safer (plan for  lethal means safety):   Patient did not identify any lethal methods: Yes     Optional: What is most important to me and worth living for?   My children     [place] Safety Plan. Ilene Boyce and Chester Livingston. Used with permission of the authors.

## 2024-04-11 ENCOUNTER — TELEPHONE (OUTPATIENT)
Dept: PSYCHIATRY | Facility: CLINIC | Age: 29
End: 2024-04-11

## 2024-04-11 NOTE — TELEPHONE ENCOUNTER
Left voicemail informing patient and/or parent/guardian of the Psych Encounter form needing to be signed as a requirement from the insurance company for billing purposes. Patient can access form via [employer] and sign electronically.     Please make patient aware this form must be signed for each visit as a requirement to continue future visits with provider.

## 2024-04-29 ENCOUNTER — TELEMEDICINE (OUTPATIENT)
Dept: BEHAVIORAL/MENTAL HEALTH CLINIC | Facility: CLINIC | Age: 29
End: 2024-04-29

## 2024-04-29 DIAGNOSIS — F41.9 ANXIETY: Primary | ICD-10-CM

## 2024-04-30 NOTE — PSYCH
"Behavioral Health Psychotherapy Progress Note    Psychotherapy Provided: Individual Psychotherapy     1. Anxiety          Goals addressed in session: Goal 1 and Goal 2     DATA: Shannan said that she has managed her anxiety effectively since last session. She remained calm even when her dog had to have surgery last Friday. Shannan said her younger son Stefano's behavior has been better recently. She is finishing up her semester at [employer] and looking forward to new courses she will start taking in the middle of June. Her  has had steady work recently which has helped alleviate financial stress. Her family has been busy with going to car races for her son Stefano on the weekends now that the weather is warm. They will be taking a trip to [place] for a racing competition soon. Session was brief because Shannan was called into work early during session.   During this session, this clinician used the following therapeutic modalities: Client-centered Therapy and Supportive Psychotherapy    Substance Abuse was not addressed during this session. If the client is diagnosed with a co-occurring substance use disorder, please indicate any changes in the frequency or amount of use: n/a. Stage of change for addressing substance use diagnoses: No substance use/Not applicable    ASSESSMENT:  Shannan Patterson presents with a Euthymic/ normal mood. her affect is Normal range and intensity, which is congruent, with her mood and the content of the session. The client has made progress on their goals. She did some self care and got her nails done by herself over the weekend. Shannan Patterson presents with a minimal risk of suicide, minimal risk of self-harm, and minimal risk of harm to others. We did EDER-7 in session today, pt scored a 2 (answered several days for questions 1 and 6).    For any risk assessment that surpasses a \"low\" rating, a safety plan must be developed.    A safety plan was indicated: no  If yes, describe in detail " n/a    PLAN: Between sessions, Shannan Patterson will use coping skills as needed and practice self care. At the next session, the therapist will use Client-centered Therapy and Mindfulness-based Strategies to address working with Shannan to develop more anxiety coping skills.    Behavioral Health Treatment Plan and Discharge Planning: Shannan Patterson is aware of and agrees to continue to work on their treatment plan. They have identified and are working toward their discharge goals. yes    Visit start and stop times:    04/30/24  Start Time: 0802  Stop Time: 0829  Total Visit Time: 27 minutes    Virtual Regular Visit    Verification of patient location:    Patient is located at Home in the following state in which I hold an active license [place]    Encounter provider BETTY Jiang     The patient was identified by name and date of birth. Shannan Patterson was informed that this is a telemedicine visit and that the visit is being conducted throughthe [employer] platform. She agrees to proceed. My office door was closed. No one else was in the room.  She acknowledged consent and understanding of privacy and security of the video platform. The patient has agreed to participate and understands they can discontinue the visit at any time.    Patient is aware this is a billable service.

## 2024-05-13 ENCOUNTER — TELEMEDICINE (OUTPATIENT)
Dept: BEHAVIORAL/MENTAL HEALTH CLINIC | Facility: CLINIC | Age: 29
End: 2024-05-13

## 2024-05-13 DIAGNOSIS — F41.9 ANXIETY: Primary | ICD-10-CM

## 2024-05-15 NOTE — PSYCH
"Behavioral Health Psychotherapy Progress Note    Psychotherapy Provided: Individual Psychotherapy     1. Anxiety          Goals addressed in session: Goal 1     DATA: Shannan said that she found out that her dog has cancer. They found out when they took her to the vet last week because she has been so fatigued lately. Shannan said her best friend had her baby on Saturday. He only weighed two pounds, but is doing well and should be able to be discharged from the hospital soon. Shannan said that she feels her anxiety has been controlled and lessened recently. She feels more settled in at school and states that she feels her mood is better due to the weather being better too.   During this session, this clinician used the following therapeutic modalities: Client-centered Therapy and Supportive Psychotherapy    Substance Abuse was not addressed during this session. If the client is diagnosed with a co-occurring substance use disorder, please indicate any changes in the frequency or amount of use: n/a. Stage of change for addressing substance use diagnoses: No substance use/Not applicable    ASSESSMENT:  Shannan Patterson presents with a Euthymic/ normal mood. her affect is Normal range and intensity, which is congruent, with her mood and the content of the session. The client has made progress on their goals. She continues to manage her stress and anxiety appropriately. Shannan Patterson presents with a none risk of suicide, none risk of self-harm, and none risk of harm to others.    For any risk assessment that surpasses a \"low\" rating, a safety plan must be developed.    A safety plan was indicated: no  If yes, describe in detail n/a    PLAN: Between sessions, Shannan Patterson will use coping skills as necessary and practice self care. At the next session, the therapist will use Client-centered Therapy and Supportive Psychotherapy to address Shannan's treatment goals. Shannan agreed that reducing session frequency to " once/month will be sufficient moving forward due to her lessened symptoms.     Behavioral Health Treatment Plan and Discharge Planning: Shannan Patterson is aware of and agrees to continue to work on their treatment plan. They have identified and are working toward their discharge goals. yes    Visit start and stop times:    05/13/24  Start Time: 0808  Stop Time: 0833  Total Visit Time: 25 minutes    Virtual Regular Visit    Verification of patient location:    Patient is located at Home in the following state in which I hold an active license [place]    Encounter provider BETTY Jiang     The patient was identified by name and date of birth. Shannan Patterson was informed that this is a telemedicine visit and that the visit is being conducted throughthe [employer] platform. She agrees to proceed. My office door was closed. No one else was in the room.  She acknowledged consent and understanding of privacy and security of the video platform. The patient has agreed to participate and understands they can discontinue the visit at any time.    Patient is aware this is a billable service.

## 2024-06-10 ENCOUNTER — TELEPHONE (OUTPATIENT)
Dept: PSYCHIATRY | Facility: CLINIC | Age: 29
End: 2024-06-10

## 2024-06-10 NOTE — TELEPHONE ENCOUNTER
Writer sent a my chart message to inform patient that today's appointment has been cancelled due to provider being out of office. Next apt is 7/8. Please reschedule upon return call.TY

## 2024-07-08 ENCOUNTER — TELEMEDICINE (OUTPATIENT)
Dept: BEHAVIORAL/MENTAL HEALTH CLINIC | Facility: CLINIC | Age: 29
End: 2024-07-08
Payer: COMMERCIAL

## 2024-07-08 DIAGNOSIS — F41.9 ANXIETY: Primary | ICD-10-CM

## 2024-07-08 PROCEDURE — 90832 PSYTX W PT 30 MINUTES: CPT

## 2024-07-10 NOTE — PSYCH
Behavioral Health Psychotherapy Progress Note    Psychotherapy Provided: Individual Psychotherapy     1. Anxiety          Goals addressed in session: Goal 1 and Goal 2     DATA: Shannan said that her dog  on . Her sons have been asking to get a new dog since she passed. She has been journaling about when she has felt anxious between sessions. She said that she was noticeably anxious on  when she and her family were getting ready to leave for one of her son's racing events in [place]. Shannan said that she was also anxious at night during that trip and had some trouble falling asleep. We discussed coping skills that Shannan has been using to manage the anxiety (primarily deep breathing exercises and/or going on her phone to distract herself/tire her brain out). We discussed possible use of progressive muscle relaxation as a coping skill, particularly when she is having trouble falling asleep. Shannan expressed interest and said she will do some research on progressive muscle relaxation. She is currently taking 2 classes through [employer] and will be taking 3 classes in the . Shannan and her  Stefano are trying to have another child. Shannan said she feels better prepared to deal with any possible pregnancy complications and associated emotions now that she has been through some complicated pregnancies before. Overall, Shannan feels her anxiety has been managed between sessions and her mood has been stable.  During this session, this clinician used the following therapeutic modalities: Client-centered Therapy, Mindfulness-based Strategies, and Supportive Psychotherapy    Substance Abuse was not addressed during this session. If the client is diagnosed with a co-occurring substance use disorder, please indicate any changes in the frequency or amount of use: n/a. Stage of change for addressing substance use diagnoses: No substance use/Not applicable    ASSESSMENT:  Shannan Patterson presents with a  "Euthymic/ normal mood. her affect is Normal range and intensity, which is congruent, with her mood and the content of the session. The client has made progress on their goals. She is journaling to express emotions and track her anxiety symptoms and triggers. Shannan Patterson presents with a none risk of suicide, none risk of self-harm, and none risk of harm to others.    For any risk assessment that surpasses a \"low\" rating, a safety plan must be developed.    A safety plan was indicated: no  If yes, describe in detail n/a    PLAN: Between sessions, Shannan Patterson will use coping skills as needed and practice self care. At the next session, the therapist will use Client-centered Therapy, Cognitive Behavioral Therapy, Mindfulness-based Strategies, and Supportive Psychotherapy to address [place] treatment goals.    Behavioral Health Treatment Plan and Discharge Planning: Shannan Patterson is aware of and agrees to continue to work on their treatment plan. They have identified and are working toward their discharge goals. yes    Visit start and stop times:    07/08/24  Start Time: 0800  Stop Time: 0836  Total Visit Time: 36 minutes    Virtual Regular Visit    Verification of patient location:    Patient is located at Home in the following state in which I hold an active license [place]    Encounter provider BETTY Jiang    The patient was identified by name and date of birth. Shannan Patterson was informed that this is a telemedicine visit and that the visit is being conducted throughthe [employer] platform. She agrees to proceed..  My office door was closed. No one else was in the room.  She acknowledged consent and understanding of privacy and security of the video platform. The patient has agreed to participate and understands they can discontinue the visit at any time.    Patient is aware this is a billable service.   "

## 2024-07-31 ENCOUNTER — VBI (OUTPATIENT)
Dept: ADMINISTRATIVE | Facility: OTHER | Age: 29
End: 2024-07-31

## 2024-07-31 NOTE — TELEPHONE ENCOUNTER
07/31/24 1:40 PM     Chart reviewed for Pap Smear (HPV) aka Cervical Cancer Screening was/were submitted to the patient's insurance.     Isabela Mendosa   [facility]

## 2024-08-06 ENCOUNTER — TELEPHONE (OUTPATIENT)
Dept: PSYCHIATRY | Facility: CLINIC | Age: 29
End: 2024-08-06

## 2024-08-06 NOTE — TELEPHONE ENCOUNTER
Called and LVM for patient due to canceling two appointments with provider. Please do schedule patient upon returned call. Patient is aware of next scheduled appointment.

## 2024-09-16 ENCOUNTER — TELEMEDICINE (OUTPATIENT)
Dept: BEHAVIORAL/MENTAL HEALTH CLINIC | Facility: CLINIC | Age: 29
End: 2024-09-16
Payer: COMMERCIAL

## 2024-09-16 DIAGNOSIS — F41.9 ANXIETY: Primary | ICD-10-CM

## 2024-09-16 PROCEDURE — 90832 PSYTX W PT 30 MINUTES: CPT

## 2024-09-17 NOTE — PSYCH
"Behavioral Health Psychotherapy Progress Note    Psychotherapy Provided: Individual Psychotherapy     1. Anxiety          Goals addressed in session: Goal 1 and Goal 2     DATA: Shannan said that she feels her anxiety has been well-managed over the past few months. She said her son Lemuel has been behaving much better at home and hasn't been giving her a hard time with going to school. He has been doing so well that he is going to go on a hiatus from his own therapy at [facility]. Shannan said she and her sister are enrolled in a couple of the same classes at [employer] this semester. She is not doing any accelerated courses this semester, which has made balancing school and work more doable for her. Overall, Shannan is pleased with the progress she has made and experience she has had doing therapy, and she feels she is ready for discharge at this time. She had to log off of the call early to attend to her child.   During this session, this clinician used the following therapeutic modalities: Client-centered Therapy and Supportive Psychotherapy    Substance Abuse was not addressed during this session. If the client is diagnosed with a co-occurring substance use disorder, please indicate any changes in the frequency or amount of use: n/a. Stage of change for addressing substance use diagnoses: No substance use/Not applicable    ASSESSMENT:  Shannan Patterson presents with a Euthymic/ normal mood. her affect is Normal range and intensity, which is congruent, with her mood and the content of the session. The client has made progress on their goals. Shannan Patterson presents with a none risk of suicide, none risk of self-harm, and none risk of harm to others.    For any risk assessment that surpasses a \"low\" rating, a safety plan must be developed.    A safety plan was indicated: no  If yes, describe in detail n/a    PLAN: Between sessions, Shannan Patterson will continue to use coping skills as needed and practice " self care. Shannan will be discharged following today's session.     Behavioral Health Treatment Plan and Discharge Planning: Shannan Patterson is aware of and agrees to continue to work on their treatment plan. They have identified and are working toward their discharge goals. yes    Visit start and stop times:    09/16/24  Start Time: 0806  Stop Time: 0824  Total Visit Time: 18 minutes    Virtual Regular Visit    Verification of patient location:    Patient is located at Home in the following state in which I hold an active license [place]    Encounter provider BETTY Jiang    The patient was identified by name and date of birth. Shannan Patterson was informed that this is a telemedicine visit and that the visit is being conducted throughthe [employer] platform. She agrees to proceed. My office door was closed. No one else was in the room.  She acknowledged consent and understanding of privacy and security of the video platform. The patient has agreed to participate and understands they can discontinue the visit at any time.    Patient is aware this is a billable service.

## 2024-11-20 ENCOUNTER — OFFICE VISIT (OUTPATIENT)
Dept: FAMILY MEDICINE CLINIC | Facility: CLINIC | Age: 29
End: 2024-11-20
Payer: COMMERCIAL

## 2024-11-20 VITALS
SYSTOLIC BLOOD PRESSURE: 112 MMHG | BODY MASS INDEX: 30.37 KG/M2 | HEIGHT: 66 IN | TEMPERATURE: 97.5 F | OXYGEN SATURATION: 97 % | WEIGHT: 189 LBS | RESPIRATION RATE: 18 BRPM | DIASTOLIC BLOOD PRESSURE: 72 MMHG | HEART RATE: 87 BPM

## 2024-11-20 DIAGNOSIS — M22.2X1 PATELLOFEMORAL PAIN SYNDROME OF RIGHT KNEE: ICD-10-CM

## 2024-11-20 DIAGNOSIS — Z00.00 ANNUAL PHYSICAL EXAM: Primary | ICD-10-CM

## 2024-11-20 PROCEDURE — 99213 OFFICE O/P EST LOW 20 MIN: CPT | Performed by: FAMILY MEDICINE

## 2024-11-20 PROCEDURE — 99395 PREV VISIT EST AGE 18-39: CPT | Performed by: FAMILY MEDICINE

## 2024-11-20 RX ORDER — NAPROXEN 500 MG/1
500 TABLET ORAL 2 TIMES DAILY WITH MEALS
Qty: 30 TABLET | Refills: 0 | Status: SHIPPED | OUTPATIENT
Start: 2024-11-20

## 2024-11-20 NOTE — PROGRESS NOTES
Adult Annual Physical  Name: Shannan Patterson      : 1995      MRN: [id number]  Encounter Provider: Lester Macias MD  Encounter Date: 2024   Encounter department: [facility]    Assessment & Plan  Annual physical exam         Patellofemoral pain syndrome of right knee  Consistent with patellofemoral pain syndrome.  Start naproxen and referral to physical therapy.  Work on strengthening exercises.  Orders:    naproxen (Naprosyn) 500 mg tablet; Take 1 tablet (500 mg total) by mouth 2 (two) times a day with meals    Ambulatory Referral to Physical Therapy; Future    Annual physical and Problem visit completed today   Orders and plan as above     Immunizations and preventive care screenings were discussed with patient today. Appropriate education was printed on patient's after visit summary.    Counseling:  Alcohol/drug use: discussed moderation in alcohol intake, the recommendations for healthy alcohol use, and avoidance of illicit drug use.  Dental Health: discussed importance of regular tooth brushing, flossing, and dental visits.  Injury prevention: discussed safety/seat belts, safety helmets, smoke detectors, carbon monoxide detectors, and smoking near bedding or upholstery.  Sexual health: discussed sexually transmitted diseases, partner selection, use of condoms, avoidance of unintended pregnancy, and contraceptive alternatives.  Exercise: the importance of regular exercise/physical activity was discussed. Recommend exercise 3-5 times per week for at least 30 minutes.       Depression Screening and Follow-up Plan: Patient was screened for depression during today's encounter. They screened negative with a PHQ-2 score of 0.        History of Present Illness     Adult Annual Physical:  Patient presents for annual physical. Annual physical and right knee pain  Right knee pain worsening over the past month.  Worse with squatting and going up stairs.  Feels pain at the top of her  "knee.  Has been using ice.  On her feet for extended periods of time.  Denies any instability.  Denies any swelling.     Diet and Physical Activity:  - Diet/Nutrition: well balanced diet.  - Exercise: walking.    Depression Screening:  - PHQ-2 Score: 0    General Health:  - Sleep: sleeps well.  - Hearing: normal hearing bilateral ears.  - Vision: no vision problems.  - Dental: regular dental visits.    /GYN Health:  - Follows with GYN: yes.     Review of Systems   Constitutional:  Negative for activity change, fatigue and fever.   Eyes:  Negative for visual disturbance.   Respiratory:  Negative for shortness of breath.    Cardiovascular:  Negative for chest pain.   Gastrointestinal:  Negative for abdominal pain, constipation, diarrhea and nausea.   Endocrine: Negative for cold intolerance and heat intolerance.   Musculoskeletal:  Negative for back pain.        Right knee pain top of the knee. Worse with activity such as squatting and going up and down stairs.    Skin:  Negative for rash.   Neurological:  Negative for headaches.   Psychiatric/Behavioral:  Negative for confusion.          Objective   /72 (BP Location: Left arm, Patient Position: Sitting, Cuff Size: Standard)   Pulse 87   Temp 97.5 °F (36.4 °C) (Temporal)   Resp 18   Ht 5' 5.6\" (1.666 m)   Wt 85.7 kg (189 lb)   SpO2 97%   BMI 30.88 kg/m²     Physical Exam  Vitals and nursing note reviewed.   Constitutional:       Appearance: Normal appearance. She is well-developed.   HENT:      Head: Normocephalic and atraumatic.   Cardiovascular:      Rate and Rhythm: Normal rate and regular rhythm.   Pulmonary:      Effort: Pulmonary effort is normal.      Breath sounds: Normal breath sounds.   Abdominal:      General: Bowel sounds are normal.      Palpations: Abdomen is soft.   Musculoskeletal:         General: Tenderness present.      Cervical back: Normal range of motion.      Right knee: No swelling or crepitus. Normal range of motion. Tenderness " present. No medial joint line, lateral joint line, MCL, LCL, ACL, PCL or patellar tendon tenderness. Normal alignment.      Left knee: No swelling or crepitus. Normal range of motion. No tenderness. Normal alignment.        Legs:    Skin:     General: Skin is warm.   Neurological:      General: No focal deficit present.      Mental Status: She is alert.   Psychiatric:         Mood and Affect: Mood normal.         Speech: Speech normal.

## 2025-05-08 ENCOUNTER — PATIENT MESSAGE (OUTPATIENT)
Dept: FAMILY MEDICINE CLINIC | Facility: CLINIC | Age: 30
End: 2025-05-08

## 2025-05-20 DIAGNOSIS — E66.9 OBESITY (BMI 30-39.9): Primary | ICD-10-CM
